# Patient Record
Sex: MALE | Race: WHITE | NOT HISPANIC OR LATINO | Employment: OTHER | URBAN - METROPOLITAN AREA
[De-identification: names, ages, dates, MRNs, and addresses within clinical notes are randomized per-mention and may not be internally consistent; named-entity substitution may affect disease eponyms.]

---

## 2023-08-23 PROBLEM — R31.9 HEMATURIA: Status: ACTIVE | Noted: 2023-08-23

## 2023-08-23 PROBLEM — T81.42XD: Status: ACTIVE | Noted: 2023-08-23

## 2023-08-23 PROBLEM — N52.9 ERECTILE DYSFUNCTION: Status: ACTIVE | Noted: 2023-08-23

## 2023-08-23 PROBLEM — M25.551 CHRONIC PAIN OF RIGHT HIP: Status: ACTIVE | Noted: 2023-08-23

## 2023-08-23 PROBLEM — M87.051 AVASCULAR NECROSIS OF HIP, RIGHT (MULTI): Status: ACTIVE | Noted: 2023-08-23

## 2023-08-23 PROBLEM — S46.011A STRAIN OF MUSC/TEND THE ROTATOR CUFF OF RIGHT SHOULDER, INIT: Status: ACTIVE | Noted: 2023-08-23

## 2023-08-23 PROBLEM — H65.492: Status: ACTIVE | Noted: 2023-08-23

## 2023-08-23 PROBLEM — D49.0 PAROTID NEOPLASM: Status: ACTIVE | Noted: 2023-08-23

## 2023-08-23 PROBLEM — H60.90 OTITIS EXTERNA: Status: ACTIVE | Noted: 2023-08-23

## 2023-08-23 PROBLEM — E78.5 HYPERLIPIDEMIA: Status: ACTIVE | Noted: 2023-08-23

## 2023-08-23 PROBLEM — T84.039A LOOSE ORTHOPEDIC IMPLANT (CMS-HCC): Status: ACTIVE | Noted: 2023-08-23

## 2023-08-23 PROBLEM — H93.13 SUBJECTIVE TINNITUS OF BOTH EARS: Status: ACTIVE | Noted: 2023-08-23

## 2023-08-23 PROBLEM — D36.9 ADENOMATOUS POLYPS: Status: ACTIVE | Noted: 2023-08-23

## 2023-08-23 PROBLEM — G89.29 CHRONIC PAIN OF BOTH SHOULDERS: Status: ACTIVE | Noted: 2023-08-23

## 2023-08-23 PROBLEM — I72.8 SPLENIC ARTERY ANEURYSM (CMS-HCC): Status: ACTIVE | Noted: 2023-08-23

## 2023-08-23 PROBLEM — R79.89 LOW TESTOSTERONE: Status: ACTIVE | Noted: 2023-08-23

## 2023-08-23 PROBLEM — R82.90 ABNORMAL FINDING IN URINE: Status: ACTIVE | Noted: 2023-08-23

## 2023-08-23 PROBLEM — M25.512 LEFT SHOULDER PAIN: Status: ACTIVE | Noted: 2023-08-23

## 2023-08-23 PROBLEM — E55.9 VITAMIN D DEFICIENCY: Status: ACTIVE | Noted: 2023-08-23

## 2023-08-23 PROBLEM — J01.90 ACUTE SINUSITIS: Status: ACTIVE | Noted: 2023-08-23

## 2023-08-23 PROBLEM — M25.569 KNEE PAIN: Status: ACTIVE | Noted: 2023-08-23

## 2023-08-23 PROBLEM — F17.200 TOBACCO USE DISORDER: Status: ACTIVE | Noted: 2023-08-23

## 2023-08-23 PROBLEM — N28.1 RENAL CYST, LEFT: Status: ACTIVE | Noted: 2023-08-23

## 2023-08-23 PROBLEM — M17.12 PRIMARY OSTEOARTHRITIS OF LEFT KNEE: Status: ACTIVE | Noted: 2023-08-23

## 2023-08-23 PROBLEM — I10 HYPERTENSION: Status: ACTIVE | Noted: 2023-08-23

## 2023-08-23 PROBLEM — M54.6 THORACIC SPINE PAIN: Status: ACTIVE | Noted: 2023-08-23

## 2023-08-23 PROBLEM — M25.551 RIGHT HIP PAIN: Status: ACTIVE | Noted: 2023-08-23

## 2023-08-23 PROBLEM — J18.9 CAP (COMMUNITY ACQUIRED PNEUMONIA): Status: ACTIVE | Noted: 2023-08-23

## 2023-08-23 PROBLEM — J30.9 ALLERGIC RHINITIS: Status: ACTIVE | Noted: 2023-08-23

## 2023-08-23 PROBLEM — R36.1 BLOOD IN SEMEN: Status: ACTIVE | Noted: 2023-08-23

## 2023-08-23 PROBLEM — D11.0 BENIGN NEOPLASM OF PAROTID GLAND: Status: ACTIVE | Noted: 2023-08-23

## 2023-08-23 PROBLEM — R20.2 PARESTHESIA: Status: ACTIVE | Noted: 2023-08-23

## 2023-08-23 PROBLEM — A04.72 C. DIFFICILE COLITIS: Status: ACTIVE | Noted: 2023-08-23

## 2023-08-23 PROBLEM — M54.9 CHRONIC BACK PAIN: Status: ACTIVE | Noted: 2023-08-23

## 2023-08-23 PROBLEM — G89.29 CHRONIC PAIN OF RIGHT HIP: Status: ACTIVE | Noted: 2023-08-23

## 2023-08-23 PROBLEM — G56.03 BILATERAL CARPAL TUNNEL SYNDROME: Status: ACTIVE | Noted: 2023-08-23

## 2023-08-23 PROBLEM — G89.29 CHRONIC BACK PAIN: Status: ACTIVE | Noted: 2023-08-23

## 2023-08-23 PROBLEM — M79.659 THIGH PAIN: Status: ACTIVE | Noted: 2023-08-23

## 2023-08-23 PROBLEM — N40.1 BPH WITH OBSTRUCTION/LOWER URINARY TRACT SYMPTOMS: Status: ACTIVE | Noted: 2023-08-23

## 2023-08-23 PROBLEM — T84.51XA INFECTION OF RIGHT PROSTHETIC HIP JOINT (CMS-HCC): Status: ACTIVE | Noted: 2023-08-23

## 2023-08-23 PROBLEM — K21.9 ESOPHAGEAL REFLUX: Status: ACTIVE | Noted: 2023-08-23

## 2023-08-23 PROBLEM — N52.9 ORGANIC IMPOTENCE: Status: ACTIVE | Noted: 2023-08-23

## 2023-08-23 PROBLEM — R06.09 DYSPNEA ON EXERTION: Status: ACTIVE | Noted: 2023-08-23

## 2023-08-23 PROBLEM — R73.09 ABNORMAL GLUCOSE: Status: ACTIVE | Noted: 2023-08-23

## 2023-08-23 PROBLEM — E66.9 OBESITY: Status: ACTIVE | Noted: 2023-08-23

## 2023-08-23 PROBLEM — M17.11 PRIMARY LOCALIZED OSTEOARTHROSIS OF RIGHT LOWER LEG: Status: ACTIVE | Noted: 2023-08-23

## 2023-08-23 PROBLEM — N13.8 BPH WITH OBSTRUCTION/LOWER URINARY TRACT SYMPTOMS: Status: ACTIVE | Noted: 2023-08-23

## 2023-08-23 PROBLEM — M25.512 CHRONIC PAIN OF BOTH SHOULDERS: Status: ACTIVE | Noted: 2023-08-23

## 2023-08-23 PROBLEM — H69.90 EUSTACHIAN TUBE DYSFUNCTION: Status: ACTIVE | Noted: 2023-08-23

## 2023-08-23 PROBLEM — D64.9 ANEMIA: Status: ACTIVE | Noted: 2023-08-23

## 2023-08-23 PROBLEM — N20.0 NEPHROLITHIASIS: Status: ACTIVE | Noted: 2023-08-23

## 2023-08-23 PROBLEM — T84.9XXA: Status: ACTIVE | Noted: 2023-08-23

## 2023-08-23 PROBLEM — E66.01 MORBIDLY OBESE (MULTI): Status: ACTIVE | Noted: 2023-08-23

## 2023-08-23 PROBLEM — E11.9 TYPE 2 DIABETES MELLITUS (MULTI): Status: ACTIVE | Noted: 2023-08-23

## 2023-08-23 PROBLEM — N28.89 RENAL MASS: Status: ACTIVE | Noted: 2023-08-23

## 2023-08-23 PROBLEM — J44.9 COPD (CHRONIC OBSTRUCTIVE PULMONARY DISEASE) (MULTI): Status: ACTIVE | Noted: 2023-08-23

## 2023-08-23 PROBLEM — T84.59XA PROSTHETIC HIP INFECTION (CMS-HCC): Status: ACTIVE | Noted: 2023-08-23

## 2023-08-23 PROBLEM — M25.511 CHRONIC PAIN OF BOTH SHOULDERS: Status: ACTIVE | Noted: 2023-08-23

## 2023-08-23 PROBLEM — L53.9 ERYTHEMA: Status: ACTIVE | Noted: 2023-08-23

## 2023-08-23 PROBLEM — F32.A DEPRESSION: Status: ACTIVE | Noted: 2023-08-23

## 2023-08-23 PROBLEM — H90.3 SENSORINEURAL HEARING LOSS, BILATERAL: Status: ACTIVE | Noted: 2023-08-23

## 2023-08-23 PROBLEM — M62.838 TRAPEZIUS MUSCLE SPASM: Status: ACTIVE | Noted: 2023-08-23

## 2023-08-23 PROBLEM — M17.9 OSTEOARTHRITIS OF KNEE: Status: ACTIVE | Noted: 2023-08-23

## 2023-08-23 PROBLEM — D36.9 TUBULAR ADENOMA: Status: ACTIVE | Noted: 2023-08-23

## 2023-08-23 PROBLEM — L29.9 ITCHING: Status: ACTIVE | Noted: 2023-08-23

## 2023-08-23 PROBLEM — J20.9 ACUTE BRONCHITIS: Status: ACTIVE | Noted: 2023-08-23

## 2023-08-23 PROBLEM — K11.7 SALIVARY SECRETION DISTURBANCE: Status: ACTIVE | Noted: 2023-08-23

## 2023-08-23 PROBLEM — A49.8 ENTEROCOCCUS FAECALIS INFECTION: Status: ACTIVE | Noted: 2023-08-23

## 2023-08-23 PROBLEM — E74.39 OTHER DISORDERS OF INTESTINAL CARBOHYDRATE ABSORPTION: Status: ACTIVE | Noted: 2023-08-23

## 2023-08-23 PROBLEM — M54.2 NECK PAIN: Status: ACTIVE | Noted: 2023-08-23

## 2023-08-23 PROBLEM — Z79.2 LONG TERM (CURRENT) USE OF ANTIBIOTICS: Status: ACTIVE | Noted: 2023-08-23

## 2023-08-23 PROBLEM — H65.00 ACUTE SEROUS OTITIS MEDIA: Status: ACTIVE | Noted: 2023-08-23

## 2023-08-23 PROBLEM — R97.20 ELEVATED PROSTATE SPECIFIC ANTIGEN (PSA): Status: ACTIVE | Noted: 2023-08-23

## 2023-08-23 PROBLEM — M25.559 PAINFUL HIP: Status: ACTIVE | Noted: 2023-08-23

## 2023-08-23 PROBLEM — Z96.649 PROSTHETIC HIP INFECTION (CMS-HCC): Status: ACTIVE | Noted: 2023-08-23

## 2023-08-23 PROBLEM — G47.30 SLEEP APNEA: Status: ACTIVE | Noted: 2023-08-23

## 2023-08-23 PROBLEM — R19.7 DIARRHEA: Status: ACTIVE | Noted: 2023-08-23

## 2023-08-23 PROBLEM — E29.1 HYPOGONADISM MALE: Status: ACTIVE | Noted: 2023-08-23

## 2023-08-23 PROBLEM — G89.29 CHRONIC PAIN: Status: ACTIVE | Noted: 2023-08-23

## 2023-08-23 PROBLEM — M54.50 LOW BACK PAIN: Status: ACTIVE | Noted: 2023-08-23

## 2023-08-23 PROBLEM — A49.02 MRSA INFECTION: Status: ACTIVE | Noted: 2023-08-23

## 2023-08-23 PROBLEM — R73.9 HYPERGLYCEMIA: Status: ACTIVE | Noted: 2023-08-23

## 2023-08-23 PROBLEM — M25.50 ARTHRALGIA: Status: ACTIVE | Noted: 2023-08-23

## 2023-08-23 PROBLEM — Z85.528 HISTORY OF KIDNEY CANCER: Status: ACTIVE | Noted: 2023-08-23

## 2023-08-23 RX ORDER — TAMSULOSIN HYDROCHLORIDE 0.4 MG/1
1 CAPSULE ORAL DAILY
COMMUNITY
Start: 2021-04-26 | End: 2024-01-26 | Stop reason: SDUPTHER

## 2023-08-23 RX ORDER — METRONIDAZOLE 7.5 MG/G
GEL TOPICAL 2 TIMES DAILY
COMMUNITY
Start: 2022-09-08

## 2023-08-23 RX ORDER — ALBUTEROL SULFATE 90 UG/1
2 AEROSOL, METERED RESPIRATORY (INHALATION)
COMMUNITY
Start: 2013-01-15

## 2023-08-23 RX ORDER — SILDENAFIL 100 MG/1
1 TABLET, FILM COATED ORAL
COMMUNITY
Start: 2021-06-03

## 2023-08-23 RX ORDER — COVID-19 MOLECULAR TEST ASSAY
KIT MISCELLANEOUS
COMMUNITY
Start: 2023-01-23

## 2023-08-23 RX ORDER — CETIRIZINE HYDROCHLORIDE 5 MG/1
1 TABLET ORAL EVERY EVENING
COMMUNITY
Start: 2017-05-05

## 2023-08-23 RX ORDER — OXYBUTYNIN CHLORIDE 10 MG/1
1 TABLET, EXTENDED RELEASE ORAL DAILY
COMMUNITY
Start: 2022-04-18

## 2023-08-23 RX ORDER — TROSPIUM CHLORIDE ER 60 MG/1
1 CAPSULE ORAL DAILY
COMMUNITY
Start: 2023-02-13

## 2023-08-23 RX ORDER — BUDESONIDE AND FORMOTEROL FUMARATE DIHYDRATE 160; 4.5 UG/1; UG/1
2 AEROSOL RESPIRATORY (INHALATION) 2 TIMES DAILY
COMMUNITY
Start: 2017-09-23 | End: 2023-09-21 | Stop reason: SDUPTHER

## 2023-08-23 RX ORDER — TESTOSTERONE 20.25 MG/1.25G
1 GEL TOPICAL DAILY
COMMUNITY
Start: 2021-06-03

## 2023-08-23 RX ORDER — PRAVASTATIN SODIUM 20 MG/1
1 TABLET ORAL DAILY
COMMUNITY
Start: 2014-12-12 | End: 2023-09-01

## 2023-08-23 RX ORDER — DOXYCYCLINE HYCLATE 20 MG
1 TABLET ORAL
COMMUNITY
Start: 2022-09-07

## 2023-08-23 RX ORDER — OMEPRAZOLE 20 MG/1
1 CAPSULE, DELAYED RELEASE ORAL DAILY
COMMUNITY
Start: 2019-02-14 | End: 2023-12-04

## 2023-08-23 RX ORDER — PREDNISONE 20 MG/1
1 TABLET ORAL 2 TIMES DAILY
COMMUNITY
Start: 2022-11-21

## 2023-08-23 RX ORDER — POLYETHYLENE GLYCOL 3350 17 G/17G
POWDER, FOR SOLUTION ORAL
COMMUNITY

## 2023-08-23 RX ORDER — AMOXICILLIN 875 MG/1
1 TABLET, FILM COATED ORAL
COMMUNITY
Start: 2020-08-21 | End: 2023-12-08 | Stop reason: SDUPTHER

## 2023-08-23 RX ORDER — ALBUTEROL SULFATE 0.83 MG/ML
SOLUTION RESPIRATORY (INHALATION)
COMMUNITY
Start: 2017-09-15

## 2023-08-23 RX ORDER — POLYETHYLENE GLYCOL 3350, SODIUM CHLORIDE, SODIUM BICARBONATE, POTASSIUM CHLORIDE 420; 11.2; 5.72; 1.48 G/4L; G/4L; G/4L; G/4L
POWDER, FOR SOLUTION ORAL
COMMUNITY
Start: 2021-11-02

## 2023-08-25 PROBLEM — D18.01 HEMANGIOMA OF SKIN AND SUBCUTANEOUS TISSUE: Status: ACTIVE | Noted: 2022-09-07

## 2023-08-25 PROBLEM — D22.70 MELANOCYTIC NEVI OF UNSPECIFIED LOWER LIMB, INCLUDING HIP: Status: ACTIVE | Noted: 2022-09-07

## 2023-08-25 PROBLEM — L90.5 SCAR CONDITION AND FIBROSIS OF SKIN: Status: ACTIVE | Noted: 2022-09-07

## 2023-08-25 PROBLEM — L57.9 SKIN CHANGES DUE TO CHRONIC EXPOSURE TO NONIONIZING RADIATION, UNSPECIFIED: Status: ACTIVE | Noted: 2022-09-07

## 2023-08-25 PROBLEM — D22.39 MELANOCYTIC NEVI OF OTHER PARTS OF FACE: Status: ACTIVE | Noted: 2022-09-07

## 2023-08-25 PROBLEM — D22.60 MELANOCYTIC NEVI OF UNSPECIFIED UPPER LIMB, INCLUDING SHOULDER: Status: ACTIVE | Noted: 2022-09-07

## 2023-08-25 PROBLEM — L71.9 ROSACEA, UNSPECIFIED: Status: ACTIVE | Noted: 2022-09-07

## 2023-08-25 PROBLEM — L71.1 RHINOPHYMA: Status: ACTIVE | Noted: 2022-09-07

## 2023-08-25 PROBLEM — D48.5 NEOPLASM OF UNCERTAIN BEHAVIOR OF SKIN: Status: ACTIVE | Noted: 2022-09-07

## 2023-08-25 PROBLEM — L30.4 ERYTHEMA INTERTRIGO: Status: ACTIVE | Noted: 2022-09-07

## 2023-08-25 PROBLEM — D22.5 MELANOCYTIC NEVI OF TRUNK: Status: ACTIVE | Noted: 2022-09-07

## 2023-08-25 PROBLEM — L82.1 OTHER SEBORRHEIC KERATOSIS: Status: ACTIVE | Noted: 2022-09-07

## 2023-08-25 PROBLEM — L81.4 OTHER MELANIN HYPERPIGMENTATION: Status: ACTIVE | Noted: 2022-09-07

## 2023-08-25 PROBLEM — L57.0 ACTINIC KERATOSIS: Status: ACTIVE | Noted: 2022-09-07

## 2023-09-01 ENCOUNTER — OFFICE VISIT (OUTPATIENT)
Dept: PRIMARY CARE | Facility: CLINIC | Age: 68
End: 2023-09-01
Payer: MEDICARE

## 2023-09-01 VITALS
HEIGHT: 72 IN | DIASTOLIC BLOOD PRESSURE: 71 MMHG | SYSTOLIC BLOOD PRESSURE: 135 MMHG | HEART RATE: 78 BPM | BODY MASS INDEX: 34.54 KG/M2 | WEIGHT: 255 LBS

## 2023-09-01 DIAGNOSIS — L20.82 FLEXURAL ECZEMA: ICD-10-CM

## 2023-09-01 DIAGNOSIS — M54.42 CHRONIC BILATERAL LOW BACK PAIN WITH BILATERAL SCIATICA: ICD-10-CM

## 2023-09-01 DIAGNOSIS — E78.2 MIXED HYPERLIPIDEMIA: ICD-10-CM

## 2023-09-01 DIAGNOSIS — I10 ESSENTIAL HYPERTENSION, BENIGN: ICD-10-CM

## 2023-09-01 DIAGNOSIS — M54.41 CHRONIC BILATERAL LOW BACK PAIN WITH BILATERAL SCIATICA: ICD-10-CM

## 2023-09-01 DIAGNOSIS — F32.A DEPRESSION, UNSPECIFIED DEPRESSION TYPE: ICD-10-CM

## 2023-09-01 DIAGNOSIS — G89.29 CHRONIC PAIN OF BOTH SHOULDERS: ICD-10-CM

## 2023-09-01 DIAGNOSIS — M25.512 CHRONIC PAIN OF BOTH SHOULDERS: ICD-10-CM

## 2023-09-01 DIAGNOSIS — R36.1 BLOOD IN SEMEN: ICD-10-CM

## 2023-09-01 DIAGNOSIS — R06.09 DYSPNEA ON EXERTION: Primary | ICD-10-CM

## 2023-09-01 DIAGNOSIS — K21.9 GASTROESOPHAGEAL REFLUX DISEASE WITHOUT ESOPHAGITIS: ICD-10-CM

## 2023-09-01 DIAGNOSIS — F17.210 CIGARETTE SMOKER: ICD-10-CM

## 2023-09-01 DIAGNOSIS — N40.1 BENIGN PROSTATIC HYPERPLASIA WITH URINARY FREQUENCY: ICD-10-CM

## 2023-09-01 DIAGNOSIS — J44.9 CHRONIC OBSTRUCTIVE PULMONARY DISEASE, UNSPECIFIED COPD TYPE (MULTI): ICD-10-CM

## 2023-09-01 DIAGNOSIS — E11.69 TYPE 2 DIABETES MELLITUS WITH OTHER SPECIFIED COMPLICATION, UNSPECIFIED WHETHER LONG TERM INSULIN USE (MULTI): ICD-10-CM

## 2023-09-01 DIAGNOSIS — R35.0 BENIGN PROSTATIC HYPERPLASIA WITH URINARY FREQUENCY: ICD-10-CM

## 2023-09-01 DIAGNOSIS — D64.9 ANEMIA, UNSPECIFIED TYPE: ICD-10-CM

## 2023-09-01 DIAGNOSIS — G89.29 CHRONIC BILATERAL LOW BACK PAIN WITH BILATERAL SCIATICA: ICD-10-CM

## 2023-09-01 DIAGNOSIS — F17.200 TOBACCO USE DISORDER: ICD-10-CM

## 2023-09-01 DIAGNOSIS — M25.511 CHRONIC PAIN OF BOTH SHOULDERS: ICD-10-CM

## 2023-09-01 PROBLEM — M87.051 AVASCULAR NECROSIS OF HIP, RIGHT (MULTI): Status: RESOLVED | Noted: 2023-08-23 | Resolved: 2023-09-01

## 2023-09-01 PROBLEM — E66.01 MORBIDLY OBESE (MULTI): Status: RESOLVED | Noted: 2023-08-23 | Resolved: 2023-09-01

## 2023-09-01 LAB
ALANINE AMINOTRANSFERASE (SGPT) (U/L) IN SER/PLAS: 47 U/L (ref 10–52)
ALBUMIN (G/DL) IN SER/PLAS: 4.5 G/DL (ref 3.4–5)
ALKALINE PHOSPHATASE (U/L) IN SER/PLAS: 55 U/L (ref 33–136)
ANION GAP IN SER/PLAS: 15 MMOL/L (ref 10–20)
ASPARTATE AMINOTRANSFERASE (SGOT) (U/L) IN SER/PLAS: 24 U/L (ref 9–39)
BASOPHILS (10*3/UL) IN BLOOD BY AUTOMATED COUNT: 0.03 X10E9/L (ref 0–0.1)
BASOPHILS/100 LEUKOCYTES IN BLOOD BY AUTOMATED COUNT: 0.5 % (ref 0–2)
BILIRUBIN TOTAL (MG/DL) IN SER/PLAS: 0.8 MG/DL (ref 0–1.2)
CALCIUM (MG/DL) IN SER/PLAS: 10.6 MG/DL (ref 8.6–10.6)
CARBON DIOXIDE, TOTAL (MMOL/L) IN SER/PLAS: 29 MMOL/L (ref 21–32)
CHLORIDE (MMOL/L) IN SER/PLAS: 100 MMOL/L (ref 98–107)
CHOLESTEROL (MG/DL) IN SER/PLAS: 172 MG/DL (ref 0–199)
CHOLESTEROL IN HDL (MG/DL) IN SER/PLAS: 35 MG/DL
CHOLESTEROL/HDL RATIO: 4.9
CREATININE (MG/DL) IN SER/PLAS: 1 MG/DL (ref 0.5–1.3)
EOSINOPHILS (10*3/UL) IN BLOOD BY AUTOMATED COUNT: 0.11 X10E9/L (ref 0–0.7)
EOSINOPHILS/100 LEUKOCYTES IN BLOOD BY AUTOMATED COUNT: 1.8 % (ref 0–6)
ERYTHROCYTE DISTRIBUTION WIDTH (RATIO) BY AUTOMATED COUNT: 13.1 % (ref 11.5–14.5)
ERYTHROCYTE MEAN CORPUSCULAR HEMOGLOBIN CONCENTRATION (G/DL) BY AUTOMATED: 32.2 G/DL (ref 32–36)
ERYTHROCYTE MEAN CORPUSCULAR VOLUME (FL) BY AUTOMATED COUNT: 99 FL (ref 80–100)
ERYTHROCYTES (10*6/UL) IN BLOOD BY AUTOMATED COUNT: 4.93 X10E12/L (ref 4.5–5.9)
GFR MALE: 82 ML/MIN/1.73M2
GLUCOSE (MG/DL) IN SER/PLAS: 211 MG/DL (ref 74–99)
HEMATOCRIT (%) IN BLOOD BY AUTOMATED COUNT: 49 % (ref 41–52)
HEMOGLOBIN (G/DL) IN BLOOD: 15.8 G/DL (ref 13.5–17.5)
IMMATURE GRANULOCYTES/100 LEUKOCYTES IN BLOOD BY AUTOMATED COUNT: 0.2 % (ref 0–0.9)
LDL: ABNORMAL MG/DL (ref 0–99)
LEUKOCYTES (10*3/UL) IN BLOOD BY AUTOMATED COUNT: 6 X10E9/L (ref 4.4–11.3)
LYMPHOCYTES (10*3/UL) IN BLOOD BY AUTOMATED COUNT: 1.48 X10E9/L (ref 1.2–4.8)
LYMPHOCYTES/100 LEUKOCYTES IN BLOOD BY AUTOMATED COUNT: 24.7 % (ref 13–44)
MONOCYTES (10*3/UL) IN BLOOD BY AUTOMATED COUNT: 0.48 X10E9/L (ref 0.1–1)
MONOCYTES/100 LEUKOCYTES IN BLOOD BY AUTOMATED COUNT: 8 % (ref 2–10)
NEUTROPHILS (10*3/UL) IN BLOOD BY AUTOMATED COUNT: 3.89 X10E9/L (ref 1.2–7.7)
NEUTROPHILS/100 LEUKOCYTES IN BLOOD BY AUTOMATED COUNT: 64.8 % (ref 40–80)
NON HDL CHOLESTEROL: 137 MG/DL
NRBC (PER 100 WBCS) BY AUTOMATED COUNT: 0 /100 WBC (ref 0–0)
PLATELETS (10*3/UL) IN BLOOD AUTOMATED COUNT: 174 X10E9/L (ref 150–450)
POC HEMOGLOBIN A1C: 8.2 % (ref 4.2–6.5)
POTASSIUM (MMOL/L) IN SER/PLAS: 5 MMOL/L (ref 3.5–5.3)
PROSTATE SPECIFIC AG (NG/ML) IN SER/PLAS: 1.43 NG/ML (ref 0–4)
PROTEIN TOTAL: 7.3 G/DL (ref 6.4–8.2)
SODIUM (MMOL/L) IN SER/PLAS: 139 MMOL/L (ref 136–145)
TRIGLYCERIDE (MG/DL) IN SER/PLAS: 427 MG/DL (ref 0–149)
UREA NITROGEN (MG/DL) IN SER/PLAS: 13 MG/DL (ref 6–23)
VLDL: ABNORMAL MG/DL (ref 0–40)

## 2023-09-01 PROCEDURE — 1159F MED LIST DOCD IN RCRD: CPT | Performed by: FAMILY MEDICINE

## 2023-09-01 PROCEDURE — 4004F PT TOBACCO SCREEN RCVD TLK: CPT | Performed by: FAMILY MEDICINE

## 2023-09-01 PROCEDURE — 84153 ASSAY OF PSA TOTAL: CPT

## 2023-09-01 PROCEDURE — 80053 COMPREHEN METABOLIC PANEL: CPT

## 2023-09-01 PROCEDURE — 80061 LIPID PANEL: CPT

## 2023-09-01 PROCEDURE — 85025 COMPLETE CBC W/AUTO DIFF WBC: CPT

## 2023-09-01 PROCEDURE — 4010F ACE/ARB THERAPY RXD/TAKEN: CPT | Performed by: FAMILY MEDICINE

## 2023-09-01 PROCEDURE — 83036 HEMOGLOBIN GLYCOSYLATED A1C: CPT | Performed by: FAMILY MEDICINE

## 2023-09-01 PROCEDURE — 3075F SYST BP GE 130 - 139MM HG: CPT | Performed by: FAMILY MEDICINE

## 2023-09-01 PROCEDURE — 3078F DIAST BP <80 MM HG: CPT | Performed by: FAMILY MEDICINE

## 2023-09-01 PROCEDURE — 1125F AMNT PAIN NOTED PAIN PRSNT: CPT | Performed by: FAMILY MEDICINE

## 2023-09-01 PROCEDURE — 1160F RVW MEDS BY RX/DR IN RCRD: CPT | Performed by: FAMILY MEDICINE

## 2023-09-01 PROCEDURE — 99214 OFFICE O/P EST MOD 30 MIN: CPT | Performed by: FAMILY MEDICINE

## 2023-09-01 RX ORDER — TRIAMCINOLONE ACETONIDE 1 MG/G
CREAM TOPICAL 2 TIMES DAILY
Qty: 45 G | Refills: 1 | Status: SHIPPED | OUTPATIENT
Start: 2023-09-01 | End: 2023-09-15

## 2023-09-01 ASSESSMENT — ENCOUNTER SYMPTOMS
WHEEZING: 1
NECK STIFFNESS: 1
GASTROINTESTINAL NEGATIVE: 1
CONSTITUTIONAL NEGATIVE: 1
NECK PAIN: 1
ARTHRALGIAS: 1
MYALGIAS: 1
CARDIOVASCULAR NEGATIVE: 1
BACK PAIN: 1
JOINT SWELLING: 1

## 2023-09-01 NOTE — PROGRESS NOTES
Subjective   Patient ID: Sarthak Portillo is a 68 y.o. male who presents for Med Refill (Pt already received his meds. He is just here for the follow up appointment. ).    Med Refill  Associated symptoms include arthralgias, joint swelling, myalgias and neck pain.    copd, htn, chf, dm , chol, bph    Review of Systems   Constitutional: Negative.    HENT: Negative.     Respiratory:  Positive for wheezing.    Cardiovascular: Negative.    Gastrointestinal: Negative.    Musculoskeletal:  Positive for arthralgias, back pain, gait problem, joint swelling, myalgias, neck pain and neck stiffness.       Objective   /71   Pulse 78   Ht 1.829 m (6')   Wt 116 kg (255 lb)   BMI 34.58 kg/m²     Physical Exam  Vitals reviewed.   Constitutional:       Appearance: Normal appearance. He is obese.   Eyes:      Extraocular Movements: Extraocular movements intact.      Conjunctiva/sclera: Conjunctivae normal.      Pupils: Pupils are equal, round, and reactive to light.   Cardiovascular:      Rate and Rhythm: Normal rate and regular rhythm.      Pulses: Normal pulses.      Heart sounds: Normal heart sounds.   Pulmonary:      Effort: Pulmonary effort is normal.      Breath sounds: Normal breath sounds.   Abdominal:      General: Bowel sounds are normal.      Palpations: Abdomen is soft.   Musculoskeletal:         General: Normal range of motion.      Comments: Severe joint and lumbar spine dis w significant limitation of mobility   Skin:     General: Skin is warm and dry.   Neurological:      General: No focal deficit present.      Mental Status: He is alert and oriented to person, place, and time. Mental status is at baseline.         Assessment/Plan   Problem List Items Addressed This Visit       Anemia    Relevant Orders    CBC and Auto Differential    Blood in semen    Chronic back pain    Chronic pain of both shoulders    COPD (chronic obstructive pulmonary disease) (CMS/HCC)    Depression    Dyspnea on exertion - Primary     Esophageal reflux    Relevant Orders    CBC and Auto Differential    Hyperlipidemia    Relevant Orders    Comprehensive Metabolic Panel    Lipid Panel    Tobacco use disorder    Relevant Orders    CT lung screening low dose    Type 2 diabetes mellitus (CMS/HCC)    Relevant Orders    POCT glycosylated hemoglobin (Hb A1C) manually resulted     Other Visit Diagnoses       Benign prostatic hyperplasia with urinary frequency        Relevant Orders    Prostate Specific Antigen    Flexural eczema        Relevant Medications    triamcinolone (Kenalog) 0.1 % cream    Essential hypertension, benign        Relevant Orders    Comprehensive Metabolic Panel    Cigarette smoker        Relevant Orders    CT lung screening low dose        Active smoker over 25 year smoking hx due for lung ct screen  Dm uncont start jardiance 10 mg

## 2023-09-05 ENCOUNTER — TELEPHONE (OUTPATIENT)
Dept: PRIMARY CARE | Facility: CLINIC | Age: 68
End: 2023-09-05
Payer: COMMERCIAL

## 2023-09-06 DIAGNOSIS — E78.5 DYSLIPIDEMIA: ICD-10-CM

## 2023-09-06 RX ORDER — ROSUVASTATIN CALCIUM 20 MG/1
20 TABLET, COATED ORAL DAILY
Qty: 90 TABLET | Refills: 0 | Status: SHIPPED | OUTPATIENT
Start: 2023-09-06 | End: 2023-12-01

## 2023-09-19 ENCOUNTER — TELEPHONE (OUTPATIENT)
Dept: PRIMARY CARE | Facility: CLINIC | Age: 68
End: 2023-09-19
Payer: COMMERCIAL

## 2023-09-20 ENCOUNTER — TELEPHONE (OUTPATIENT)
Dept: PRIMARY CARE | Facility: CLINIC | Age: 68
End: 2023-09-20
Payer: COMMERCIAL

## 2023-09-21 DIAGNOSIS — J44.9 CHRONIC OBSTRUCTIVE PULMONARY DISEASE, UNSPECIFIED COPD TYPE (MULTI): Primary | ICD-10-CM

## 2023-09-21 RX ORDER — BUDESONIDE AND FORMOTEROL FUMARATE DIHYDRATE 160; 4.5 UG/1; UG/1
2 AEROSOL RESPIRATORY (INHALATION) 2 TIMES DAILY
Qty: 1 EACH | Refills: 0 | Status: SHIPPED | OUTPATIENT
Start: 2023-09-21 | End: 2023-10-19

## 2023-10-03 ENCOUNTER — OFFICE VISIT (OUTPATIENT)
Dept: ORTHOPEDIC SURGERY | Facility: HOSPITAL | Age: 68
End: 2023-10-03
Payer: MEDICARE

## 2023-10-03 DIAGNOSIS — M19.019 SHOULDER ARTHRITIS: Primary | ICD-10-CM

## 2023-10-03 PROCEDURE — 20610 DRAIN/INJ JOINT/BURSA W/O US: CPT | Mod: RT | Performed by: ORTHOPAEDIC SURGERY

## 2023-10-03 PROCEDURE — 1125F AMNT PAIN NOTED PAIN PRSNT: CPT | Performed by: ORTHOPAEDIC SURGERY

## 2023-10-03 PROCEDURE — 1160F RVW MEDS BY RX/DR IN RCRD: CPT | Performed by: ORTHOPAEDIC SURGERY

## 2023-10-03 PROCEDURE — 99213 OFFICE O/P EST LOW 20 MIN: CPT | Performed by: ORTHOPAEDIC SURGERY

## 2023-10-03 PROCEDURE — 4010F ACE/ARB THERAPY RXD/TAKEN: CPT | Performed by: ORTHOPAEDIC SURGERY

## 2023-10-03 PROCEDURE — 1159F MED LIST DOCD IN RCRD: CPT | Performed by: ORTHOPAEDIC SURGERY

## 2023-10-03 PROCEDURE — 4004F PT TOBACCO SCREEN RCVD TLK: CPT | Performed by: ORTHOPAEDIC SURGERY

## 2023-10-03 NOTE — LETTER
October 4, 2023     Ayo Zuniga MD  [address] [place] [address] Rd  [address]  [facility] [address]    Patient: Sarthak Portillo   YOB: 1955   Date of Visit: 10/3/2023       Dear Dr. Ayo Zuniga MD:    Thank you for referring Sarthak Portillo to me for evaluation. Below are my notes for this consultation.  If you have questions, please do not hesitate to call me. I look forward to following your patient along with you.       Sincerely,     Jagdish Brito MD      CC: No Recipients  ______________________________________________________________________________________    Patient ID: Sarthak Portillo is a 68 y.o. male.    L Inj/Asp: R subacromial bursa on 10/3/2023 8:46 AM  Indications: pain  Details: 20 G needle  Outcome: tolerated well, no immediate complications  Procedure, treatment alternatives, risks and benefits explained, specific risks discussed. Consent was given by the patient. Immediately prior to procedure a time out was called to verify the correct patient, procedure, equipment, support staff and site/side marked as required. Patient was prepped and draped in the usual sterile fashion.           Subjective   Patient ID: Sarthak Portillo is a 68 y.o. male.    Chief Complaint: Right shoulder pain     Last Surgery: No surgery found  Last Surgery Date: No surgery found    He explains today that his shoulder is doing alright. His injection worked for about a month, and did provide him with relief with other pains. He is considering surgery this winter as he would like to not live with this anymore.     He has had a recent knee replacement at [facility].        Objective  Ortho Exam    Image Results:  Electrocardiogram 12 Lead  Normal sinus rhythm  Possible Left atrial enlargement  Nonspecific ST and T wave abnormality  Abnormal ECG  No previous ECGs available      Assessment/Plan  Encounter Diagnoses:  Shoulder arthritis    Orders Placed This Encounter   • L Inj/Asp   • CT shoulder right wo IV contrast     At  this time, we had a long discussion about the various options for shoulder arthritis.       1. Do nothing and continue activity modifications.       2. Consider cortisone injections into the glenohumeral joint. This would give pain relief that is temporary. This would not stop the progression of arthritis. For some patients, this injection can provide no relief at all, relief for 2 weeks, 4 weeks, 3 months or longer. The risk of the injection is fairly minimal but does include a very small chance of infection.       3. Another option is a total shoulder replacement. This will give the patient a more permanent solution to pain relief. It would also improve function. There is no rush to this procedure; it is an elective procedure.      Sarthak have severe arthritis in their shoulder. They have failed conservative management for over 6 months with injections, therapy and home exercises. They cannot continue living with their shoulders secondary to pain and disability. They have severe erosive, destructive changes within their shoulder joint limitation of motion that do not allow them to get above their shoulder level or reach behind their back. Because of the erosive and destructive changes in their shoulder joint as seen on CT scan and plain films, in addition to their severe limitation of range of motion we have recommended a reverse shoulder replacement which will help with theirpain as well as improve their function.     He is beginning to develop arthritis. He may or may not have rotator cuff tear at this time,   because of that I recommend we do a reverse shoulder replacement.   order CT for pre operative planning to be sure we have not missed any underlying pathology. He will followup with [facility] in 2 months for right reverse shoulder replacement planning.    PLAN for RIGHT reverse total shoulder replacement.    Scribe Attestation  By signing my name below, I, Michael Caldera   attest that this documentation  has been prepared under the direction and in the presence of Jagdish Brito MD.

## 2023-10-03 NOTE — PROGRESS NOTES
Patient ID: Sarthak Portillo is a 68 y.o. male.    L Inj/Asp: R subacromial bursa on 10/3/2023 8:46 AM  Indications: pain  Details: 20 G needle  Outcome: tolerated well, no immediate complications  Procedure, treatment alternatives, risks and benefits explained, specific risks discussed. Consent was given by the patient. Immediately prior to procedure a time out was called to verify the correct patient, procedure, equipment, support staff and site/side marked as required. Patient was prepped and draped in the usual sterile fashion.           Subjective    Patient ID: Sarthak Portillo is a 68 y.o. male.    Chief Complaint: Right shoulder pain     Last Surgery: No surgery found  Last Surgery Date: No surgery found    He explains today that his shoulder is doing alright. His injection worked for about a month, and did provide him with relief with other pains. He is considering surgery this winter as he would like to not live with this anymore.     He has had a recent knee replacement at [facility].        Objective   Ortho Exam    Image Results:  Electrocardiogram 12 Lead  Normal sinus rhythm  Possible Left atrial enlargement  Nonspecific ST and T wave abnormality  Abnormal ECG  No previous ECGs available      Assessment/Plan   Encounter Diagnoses:  Shoulder arthritis    Orders Placed This Encounter    L Inj/Asp    CT shoulder right wo IV contrast     At this time, we had a long discussion about the various options for shoulder arthritis.       1. Do nothing and continue activity modifications.       2. Consider cortisone injections into the glenohumeral joint. This would give pain relief that is temporary. This would not stop the progression of arthritis. For some patients, this injection can provide no relief at all, relief for 2 weeks, 4 weeks, 3 months or longer. The risk of the injection is fairly minimal but does include a very small chance of infection.       3. Another option is a total shoulder replacement.  This will give the patient a more permanent solution to pain relief. It would also improve function. There is no rush to this procedure; it is an elective procedure.      Sarthak have severe arthritis in their shoulder. They have failed conservative management for over 6 months with injections, therapy and home exercises. They cannot continue living with their shoulders secondary to pain and disability. They have severe erosive, destructive changes within their shoulder joint limitation of motion that do not allow them to get above their shoulder level or reach behind their back. Because of the erosive and destructive changes in their shoulder joint as seen on CT scan and plain films, in addition to their severe limitation of range of motion we have recommended a reverse shoulder replacement which will help with theirpain as well as improve their function.     He is beginning to develop arthritis. He may or may not have rotator cuff tear at this time,   because of that I recommend we do a reverse shoulder replacement.   order CT for pre operative planning to be sure we have not missed any underlying pathology. He will followup with Zahida in 2 months for right reverse shoulder replacement planning.    PLAN for RIGHT reverse total shoulder replacement.    Scribe Attestation  By signing my name below, Christine LOPEZ Scribe   attest that this documentation has been prepared under the direction and in the presence of Jagdish Brito MD.

## 2023-10-05 DIAGNOSIS — M19.011 ARTHRITIS OF RIGHT SHOULDER REGION: Primary | ICD-10-CM

## 2023-10-19 DIAGNOSIS — J44.9 CHRONIC OBSTRUCTIVE PULMONARY DISEASE, UNSPECIFIED COPD TYPE (MULTI): ICD-10-CM

## 2023-10-19 RX ORDER — BUDESONIDE AND FORMOTEROL FUMARATE DIHYDRATE 160; 4.5 UG/1; UG/1
2 AEROSOL RESPIRATORY (INHALATION) 2 TIMES DAILY
Qty: 10.2 EACH | Refills: 3 | Status: SHIPPED | OUTPATIENT
Start: 2023-10-19 | End: 2023-10-22

## 2023-10-19 RX ORDER — BUDESONIDE AND FORMOTEROL FUMARATE DIHYDRATE 160; 4.5 UG/1; UG/1
2 AEROSOL RESPIRATORY (INHALATION) 2 TIMES DAILY
Qty: 10.2 EACH | Refills: 3 | Status: SHIPPED | OUTPATIENT
Start: 2023-10-19 | End: 2023-10-19

## 2023-10-22 RX ORDER — BUDESONIDE AND FORMOTEROL FUMARATE DIHYDRATE 160; 4.5 UG/1; UG/1
2 AEROSOL RESPIRATORY (INHALATION) 2 TIMES DAILY
Qty: 10.2 EACH | Refills: 3 | Status: SHIPPED | OUTPATIENT
Start: 2023-10-22 | End: 2023-11-22 | Stop reason: SDUPTHER

## 2023-11-08 DIAGNOSIS — Z00.00 ENCOUNTER FOR GENERAL ADULT MEDICAL EXAMINATION WITHOUT ABNORMAL FINDINGS: ICD-10-CM

## 2023-11-08 DIAGNOSIS — I10 ESSENTIAL (PRIMARY) HYPERTENSION: ICD-10-CM

## 2023-11-08 RX ORDER — FUROSEMIDE 40 MG/1
TABLET ORAL
Qty: 90 TABLET | Refills: 0 | Status: SHIPPED | OUTPATIENT
Start: 2023-11-08 | End: 2023-12-04

## 2023-11-08 RX ORDER — LISINOPRIL 40 MG/1
TABLET ORAL
Qty: 90 TABLET | Refills: 0 | Status: SHIPPED | OUTPATIENT
Start: 2023-11-08 | End: 2024-02-05

## 2023-11-21 ENCOUNTER — APPOINTMENT (OUTPATIENT)
Dept: ORTHOPEDIC SURGERY | Facility: HOSPITAL | Age: 68
End: 2023-11-21
Payer: MEDICARE

## 2023-11-22 DIAGNOSIS — J44.9 CHRONIC OBSTRUCTIVE PULMONARY DISEASE, UNSPECIFIED COPD TYPE (MULTI): ICD-10-CM

## 2023-11-22 RX ORDER — BUDESONIDE AND FORMOTEROL FUMARATE DIHYDRATE 160; 4.5 UG/1; UG/1
2 AEROSOL RESPIRATORY (INHALATION) 2 TIMES DAILY
Qty: 10.2 EACH | Refills: 3 | Status: SHIPPED | OUTPATIENT
Start: 2023-11-22

## 2023-12-01 ENCOUNTER — APPOINTMENT (OUTPATIENT)
Dept: INFECTIOUS DISEASES | Facility: CLINIC | Age: 68
End: 2023-12-01
Payer: MEDICARE

## 2023-12-01 DIAGNOSIS — E78.5 DYSLIPIDEMIA: ICD-10-CM

## 2023-12-01 RX ORDER — ROSUVASTATIN CALCIUM 20 MG/1
20 TABLET, COATED ORAL DAILY
Qty: 90 TABLET | Refills: 0 | Status: SHIPPED | OUTPATIENT
Start: 2023-12-01 | End: 2024-02-29

## 2023-12-03 DIAGNOSIS — K21.9 GASTROESOPHAGEAL REFLUX DISEASE WITHOUT ESOPHAGITIS: ICD-10-CM

## 2023-12-03 DIAGNOSIS — E11.69 TYPE 2 DIABETES MELLITUS WITH OTHER SPECIFIED COMPLICATION, UNSPECIFIED WHETHER LONG TERM INSULIN USE (MULTI): ICD-10-CM

## 2023-12-03 DIAGNOSIS — N52.9 MALE ERECTILE DYSFUNCTION, UNSPECIFIED: ICD-10-CM

## 2023-12-03 DIAGNOSIS — Z00.00 ENCOUNTER FOR GENERAL ADULT MEDICAL EXAMINATION WITHOUT ABNORMAL FINDINGS: ICD-10-CM

## 2023-12-04 RX ORDER — EMPAGLIFLOZIN 10 MG/1
10 TABLET, FILM COATED ORAL DAILY
Qty: 90 TABLET | Refills: 1 | Status: SHIPPED | OUTPATIENT
Start: 2023-12-04

## 2023-12-04 RX ORDER — FUROSEMIDE 40 MG/1
TABLET ORAL
Qty: 90 TABLET | Refills: 0 | Status: SHIPPED | OUTPATIENT
Start: 2023-12-04 | End: 2024-05-02

## 2023-12-04 RX ORDER — OMEPRAZOLE 20 MG/1
20 CAPSULE, DELAYED RELEASE ORAL DAILY
Qty: 90 CAPSULE | Refills: 1 | Status: SHIPPED | OUTPATIENT
Start: 2023-12-04 | End: 2024-06-06

## 2023-12-04 RX ORDER — GABAPENTIN 300 MG/1
300 CAPSULE ORAL 2 TIMES DAILY
Qty: 180 CAPSULE | Refills: 0 | Status: SHIPPED | OUTPATIENT
Start: 2023-12-04 | End: 2024-02-28 | Stop reason: SDUPTHER

## 2023-12-08 ENCOUNTER — TELEMEDICINE (OUTPATIENT)
Dept: INFECTIOUS DISEASES | Facility: CLINIC | Age: 68
End: 2023-12-08
Payer: MEDICARE

## 2023-12-08 DIAGNOSIS — T84.51XD INFECTION ASSOCIATED WITH INTERNAL RIGHT HIP PROSTHESIS, SUBSEQUENT ENCOUNTER: Primary | ICD-10-CM

## 2023-12-08 PROCEDURE — 99214 OFFICE O/P EST MOD 30 MIN: CPT | Performed by: INTERNAL MEDICINE

## 2023-12-08 RX ORDER — AMOXICILLIN 875 MG/1
875 TABLET, FILM COATED ORAL
Qty: 90 TABLET | Refills: 3 | Status: SHIPPED | OUTPATIENT
Start: 2023-12-08

## 2023-12-08 NOTE — PROGRESS NOTES
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  We saw him for the first time years ago.  Previously followed by colleagues in the left .  He has been on amoxicillin since 2020.  We reviewed recent labs which look good.  Patient says he is doing well.  No joint swelling redness erythema.  Hip is doing okay.  Having some shoulder problems and might need surgery.    Virtual visit doing well.    As per the plan from before we will continue chronic suppressive therapy with amoxicillin.  Should he need any further orthopedic procedures I told him I be happy to communicate that as long as he is on suppressive amoxicillin therapy if there is any infection and sent a dormant form and biofilm and would not provide any risk to the patient for having additional surgical procedure.  We refilled his amoxicillin for a year we will follow-up virtual visit December 5, 2024

## 2024-01-23 DIAGNOSIS — N28.1 RENAL CYST: ICD-10-CM

## 2024-01-23 DIAGNOSIS — Z85.528 HISTORY OF KIDNEY CANCER: Primary | ICD-10-CM

## 2024-01-24 ENCOUNTER — LAB (OUTPATIENT)
Dept: LAB | Facility: LAB | Age: 69
End: 2024-01-24
Payer: MEDICARE

## 2024-01-24 DIAGNOSIS — Z85.528 HISTORY OF KIDNEY CANCER: ICD-10-CM

## 2024-01-24 DIAGNOSIS — N28.1 RENAL CYST: ICD-10-CM

## 2024-01-24 LAB
CREAT SERPL-MCNC: 1.14 MG/DL (ref 0.5–1.3)
EGFRCR SERPLBLD CKD-EPI 2021: 70 ML/MIN/1.73M*2

## 2024-01-24 PROCEDURE — 36415 COLL VENOUS BLD VENIPUNCTURE: CPT

## 2024-01-24 PROCEDURE — 82565 ASSAY OF CREATININE: CPT

## 2024-01-26 ENCOUNTER — HOSPITAL ENCOUNTER (OUTPATIENT)
Dept: RADIOLOGY | Facility: CLINIC | Age: 69
Discharge: HOME | End: 2024-01-26
Payer: MEDICARE

## 2024-01-26 ENCOUNTER — ANCILLARY PROCEDURE (OUTPATIENT)
Dept: RADIOLOGY | Facility: CLINIC | Age: 69
End: 2024-01-26
Payer: MEDICARE

## 2024-01-26 ENCOUNTER — APPOINTMENT (OUTPATIENT)
Dept: RADIOLOGY | Facility: CLINIC | Age: 69
End: 2024-01-26
Payer: MEDICARE

## 2024-01-26 ENCOUNTER — TELEPHONE (OUTPATIENT)
Dept: PRIMARY CARE | Facility: CLINIC | Age: 69
End: 2024-01-26
Payer: COMMERCIAL

## 2024-01-26 DIAGNOSIS — F17.210 CIGARETTE SMOKER: ICD-10-CM

## 2024-01-26 DIAGNOSIS — Z85.528 HISTORY OF KIDNEY CANCER: ICD-10-CM

## 2024-01-26 DIAGNOSIS — N28.1 RENAL CYST: ICD-10-CM

## 2024-01-26 DIAGNOSIS — R93.89 ABNORMAL CHEST CT: Primary | ICD-10-CM

## 2024-01-26 DIAGNOSIS — N13.8 BPH WITH OBSTRUCTION/LOWER URINARY TRACT SYMPTOMS: ICD-10-CM

## 2024-01-26 DIAGNOSIS — N40.1 BPH WITH OBSTRUCTION/LOWER URINARY TRACT SYMPTOMS: ICD-10-CM

## 2024-01-26 DIAGNOSIS — F17.200 TOBACCO USE DISORDER: ICD-10-CM

## 2024-01-26 PROCEDURE — 74170 CT ABD WO CNTRST FLWD CNTRST: CPT

## 2024-01-26 PROCEDURE — 71271 CT THORAX LUNG CANCER SCR C-: CPT

## 2024-01-26 PROCEDURE — 74170 CT ABD WO CNTRST FLWD CNTRST: CPT | Performed by: RADIOLOGY

## 2024-01-26 PROCEDURE — 2550000001 HC RX 255 CONTRASTS: Performed by: STUDENT IN AN ORGANIZED HEALTH CARE EDUCATION/TRAINING PROGRAM

## 2024-01-26 RX ORDER — TAMSULOSIN HYDROCHLORIDE 0.4 MG/1
0.4 CAPSULE ORAL DAILY
Qty: 30 CAPSULE | Refills: 11 | Status: SHIPPED | OUTPATIENT
Start: 2024-01-26

## 2024-01-26 RX ADMIN — IOHEXOL 69 ML: 350 INJECTION, SOLUTION INTRAVENOUS at 09:51

## 2024-02-03 DIAGNOSIS — I10 ESSENTIAL (PRIMARY) HYPERTENSION: ICD-10-CM

## 2024-02-05 RX ORDER — LISINOPRIL 40 MG/1
TABLET ORAL
Qty: 90 TABLET | Refills: 1 | Status: SHIPPED | OUTPATIENT
Start: 2024-02-05

## 2024-02-26 ENCOUNTER — TELEPHONE (OUTPATIENT)
Dept: PRIMARY CARE | Facility: CLINIC | Age: 69
End: 2024-02-26
Payer: COMMERCIAL

## 2024-02-28 DIAGNOSIS — M17.10 UNILATERAL PRIMARY OSTEOARTHRITIS, UNSPECIFIED KNEE: ICD-10-CM

## 2024-02-28 DIAGNOSIS — N52.9 MALE ERECTILE DYSFUNCTION, UNSPECIFIED: ICD-10-CM

## 2024-02-28 DIAGNOSIS — E78.5 DYSLIPIDEMIA: ICD-10-CM

## 2024-02-28 RX ORDER — DULOXETIN HYDROCHLORIDE 60 MG/1
60 CAPSULE, DELAYED RELEASE ORAL DAILY
Qty: 90 CAPSULE | Refills: 0 | Status: SHIPPED | OUTPATIENT
Start: 2024-02-28 | End: 2024-05-28

## 2024-02-28 RX ORDER — GABAPENTIN 300 MG/1
300 CAPSULE ORAL 2 TIMES DAILY
Qty: 180 CAPSULE | Refills: 0 | Status: SHIPPED | OUTPATIENT
Start: 2024-02-28

## 2024-02-29 RX ORDER — ROSUVASTATIN CALCIUM 20 MG/1
20 TABLET, COATED ORAL DAILY
Qty: 90 TABLET | Refills: 0 | Status: SHIPPED | OUTPATIENT
Start: 2024-02-29 | End: 2024-05-28

## 2024-03-20 ENCOUNTER — APPOINTMENT (OUTPATIENT)
Dept: DERMATOLOGY | Facility: CLINIC | Age: 69
End: 2024-03-20
Payer: COMMERCIAL

## 2024-03-25 ENCOUNTER — OFFICE VISIT (OUTPATIENT)
Dept: UROLOGY | Facility: CLINIC | Age: 69
End: 2024-03-25
Payer: MEDICARE

## 2024-03-25 VITALS — HEIGHT: 72 IN | BODY MASS INDEX: 34.54 KG/M2 | WEIGHT: 255 LBS | TEMPERATURE: 97.3 F

## 2024-03-25 DIAGNOSIS — R97.20 ELEVATED PROSTATE SPECIFIC ANTIGEN (PSA): Primary | ICD-10-CM

## 2024-03-25 DIAGNOSIS — Z12.5 PROSTATE CANCER SCREENING: ICD-10-CM

## 2024-03-25 DIAGNOSIS — C64.9 RENAL CELL CARCINOMA, UNSPECIFIED LATERALITY (MULTI): ICD-10-CM

## 2024-03-25 PROCEDURE — 1159F MED LIST DOCD IN RCRD: CPT | Performed by: STUDENT IN AN ORGANIZED HEALTH CARE EDUCATION/TRAINING PROGRAM

## 2024-03-25 PROCEDURE — 1126F AMNT PAIN NOTED NONE PRSNT: CPT | Performed by: STUDENT IN AN ORGANIZED HEALTH CARE EDUCATION/TRAINING PROGRAM

## 2024-03-25 PROCEDURE — G2211 COMPLEX E/M VISIT ADD ON: HCPCS | Performed by: STUDENT IN AN ORGANIZED HEALTH CARE EDUCATION/TRAINING PROGRAM

## 2024-03-25 PROCEDURE — 4010F ACE/ARB THERAPY RXD/TAKEN: CPT | Performed by: STUDENT IN AN ORGANIZED HEALTH CARE EDUCATION/TRAINING PROGRAM

## 2024-03-25 PROCEDURE — 1160F RVW MEDS BY RX/DR IN RCRD: CPT | Performed by: STUDENT IN AN ORGANIZED HEALTH CARE EDUCATION/TRAINING PROGRAM

## 2024-03-25 PROCEDURE — 99214 OFFICE O/P EST MOD 30 MIN: CPT | Performed by: STUDENT IN AN ORGANIZED HEALTH CARE EDUCATION/TRAINING PROGRAM

## 2024-03-25 ASSESSMENT — PAIN SCALES - GENERAL: PAINLEVEL: 0-NO PAIN

## 2024-03-25 NOTE — PROGRESS NOTES
Scribed for Dr. Brennan Barkley by Hansel Bishop. I, Dr. Brennan Barkley have personally reviewed and agreed with the information entered by the Virtual Scribe. 03/25/24.    ASSESSMENT:  Problem List Items Addressed This Visit          Genitourinary and Reproductive    Elevated prostate specific antigen (PSA) - Primary    Relevant Orders    PSA     Other Visit Diagnoses       Prostate cancer screening        Relevant Orders    PSA    Renal cell carcinoma, unspecified laterality (CMS/HCC)        Relevant Orders    US renal complete          BPH with LUTS - s/p HoLEP (2017) w/ Dr. Martinez, continues on tamsulosin.  Renal Cyst, LEFT  Hx of RCC - s/p biopsy/cryoablation (2005)  Erectile dysfunction - follows up with Dr. Israel, on 100mg viagra PRN.  PSA screening - last 1.43 (Sept 2023)  Hx of microhematuria - s/p negative work-up  History hip replacement with prosthesis - c/b infection/abscess, since resolved.    PLAN:  #OAB symptoms.   Reports a good response to trospium ER.   Urgency and post-void dribbling improved.   Stream somewhat slower, otherwise no side-effects.   Wishes to continue, refill provided.     #Renal cyst  We discussed the natural history of simple renal cysts.  Reassured that these are very common and benign.   I would not recommend further workup or surveillance for this.  Patient reassured.     #Hx of RCC  Repeat a renal ultrasound in 1 year for continued surveillance and ongoing care of these chronic medical conditions  RTC in 1 year for reassessment and review results.     All questions were answered to the patient’s satisfaction.  Patient agrees with the plan and wishes to proceed.  Continue follow-up for ongoing care of his chronic medical conditions.       History of Present Illness (HPI):  Sarthak presents for a follow up visit.  The patient’s EMR has been reviewed.  Lives in [place], [place].  Previously seen by Dr. Elizalde, Dr. Martinez and Dr. Israel.     History of BPH with LUTS, s/p  Kika (2017) w/ Dr. Martinez, continues on tamsulosin,   H/o RCC s/p biopsy and cryoablation (2005); renal cyst (LEFT), erectile dysfunction followed and treated by Dr. Israel, prescribed 100mg viagra in the past.     Latest PSA was stable at 1.43 (Sept 2023).  NIDDM ; POC Hgb A1c 8.2% (Sept 2023).  Splenic artery aneurysm - declined pursuing in the past.    TODAY: (03/25/24)  Last visit, provided trial of trospium.   Reports some benefit with this.  Denotes improvement of his urgency primarily.   As well as some improvement of his post-void dribbling.  Albeit, reports some slowing of his stream.  Otherwise, no significant side-effects.   C/o persistent frequency as well during the day.   Denies any recent infections, gross hematuria, fevers or chills.   Latest PSA 1.43 (Sept 2023).    TO REVIEW: Initial visit (02/13/23)  Reports persistent urinary frequency q1-1.5h.  As well as persistent post-void dribbling.   Denies any gross hematuria, or UTIs. Acknowledges being treated with tamsulosin in the past as well as oxybutynin. Notes that he discontinued oxybutynin use 2/2 developing headaches however he does state that this helped with is post-void dribbling and frequency in the past. He would like to try an alternative medication to oxybutynin today.  mL.      Personally reviewed the patient's labs and imaging, his last CT chest abd pelvis without contrast was (April 2022) which demonstrated a calcified cystic mass upper pole left kidney. Appearance is unchanged. Tiny bilateral nonobstructive renal calculi. Prostate gland enlargement.     Past Medical History:   Diagnosis Date    Chronic obstructive pulmonary disease, unspecified (CMS/HCC) 11/21/2022    COPD (chronic obstructive pulmonary disease)    Sleep apnea, unspecified 01/29/2021    Sleep apnea     Past Surgical History:   Procedure Laterality Date    APPENDECTOMY  04/04/2013    Appendectomy    CT HEAD ANGIO W AND WO IV CONTRAST  11/28/2018    CT  HEAD ANGIO W AND WO IV CONTRAST 11/28/2018 [facility]    KNEE SURGERY  03/14/2020    Knee Surgery    OTHER SURGICAL HISTORY  04/04/2013    Kidney Surgery Laparoscopic Ablation Of Renal Mass Lesion(S)    OTHER SURGICAL HISTORY  04/04/2013    Uvulectomy    OTHER SURGICAL HISTORY  03/14/2020    Back surgery    OTHER SURGICAL HISTORY  03/14/2020    Hip replacement    OTHER SURGICAL HISTORY  03/14/2020    Spinal Neurostimulator Pulse Generator    TONSILLECTOMY  04/04/2013    Tonsillectomy    UMBILICAL HERNIA REPAIR  04/04/2013    Umbilical Hernia Repair    US GUIDED ABSCESS FLUID COLLECTION DRAINAGE  8/19/2020    US GUIDED ABSCESS FLUID COLLECTION DRAINAGE 8/19/2020 [facility]     Family History   Problem Relation Name Age of Onset    Pancreatic cancer Mother       Social History     Tobacco Use   Smoking Status Every Day    Packs/day: 1    Types: Cigarettes   Smokeless Tobacco Never     Current Outpatient Medications   Medication Sig Dispense Refill    albuterol (Ventolin HFA) 90 mcg/actuation inhaler Inhale 2 puffs. 4-6 hrs prn      albuterol 2.5 mg /3 mL (0.083 %) nebulizer solution USE 1 UNIT DOSE EVERY 4-6 HOURS AS NEEDED FOR WHEEZING .      alprostadil (Muse) 500 mcg pellet Insert 1 suppository as needed, no more than 1 per day      amoxicillin (Amoxil) 875 mg tablet Take 1 tablet (875 mg) by mouth 2 times a day with meals. 90 tablet 3    BinaxNOW COVID-19 Ag Self Test kit USE AS DIRECTED      budesonide-formoteroL (Symbicort) 160-4.5 mcg/actuation inhaler Inhale 2 puffs 2 times a day. RINSE MOUTH AFTER USE 10.2 each 3    cetirizine (ZyrTEC) 5 mg tablet Take 1 tablet (5 mg) by mouth once daily in the evening.      doxycycline (Periostat) 20 mg tablet Take 1 tablet (20 mg) by mouth. WITH FOOD AND WATER      DULoxetine (Cymbalta) 60 mg DR capsule Take 1 capsule (60 mg) by mouth once daily. 90 capsule 0    furosemide (Lasix) 40 mg tablet TAKE 1 TABLET BY MOUTH EVERY DAY 90 tablet 0    gabapentin  (Neurontin) 300 mg capsule Take 1 capsule (300 mg) by mouth 2 times a day. 180 capsule 0    Jardiance 10 mg TAKE 1 TABLET BY MOUTH EVERY DAY 90 tablet 1    lisinopril 40 mg tablet TAKE 1 TABLET BY MOUTH EVERY DAY 90 tablet 1    metroNIDAZOLE (Metrogel) 0.75 % gel Apply topically 2 times a day.      omeprazole (PriLOSEC) 20 mg DR capsule TAKE 1 CAPSULE BY MOUTH EVERY DAY 90 capsule 1    oxybutynin XL (Ditropan-XL) 10 mg 24 hr tablet Take 1 tablet (10 mg) by mouth once daily.      polyethylene glycol (Glycolax, Miralax) 17 gram/dose powder Take as directed      polyethylene glycol-electrolytes 420 gram solution TAKE AS DIRECTED.      predniSONE (Deltasone) 20 mg tablet Take 1 tablet (20 mg) by mouth 2 times a day.      rosuvastatin (Crestor) 20 mg tablet TAKE 1 TABLET BY MOUTH EVERY DAY 90 tablet 0    sildenafil (Viagra) 100 mg tablet Take 1 tablet (100 mg) by mouth. 1 hour prior to sexual intercourse      tamsulosin (Flomax) 0.4 mg 24 hr capsule Take 1 capsule (0.4 mg) by mouth once daily. 30 capsule 11    testosterone 20.25 mg/1.25 gram (1.62 %) gel in metered-dose pump 1 Pump once daily. Each shoulder      trospium (Sanctura XR) 60 mg 24 hour capsule Take 1 capsule (60 mg) by mouth once daily.       No current facility-administered medications for this visit.     Allergies   Allergen Reactions    Adhesive Tape-Silicones Unknown    Iodine Other    Tramadol Hives and Rash     Past medical, surgical, family and social history in the chart was reviewed and is accurate including any additions to what is in this HPI.    REVIEW OF SYSTEMS (ROS):   Constitutional: denies any unintentional weight loss or change in strength.  Integumentary: denies any rashes or pruritus.  Eyes: denies any double vision or eye pain.  Ear/Nose/Mouth/Throat: denies any nosebleeds or gum bleeds.  Cardiovascular: denies any chest pain or syncope.  Respiratory: denies hemoptysis.  Gastrointestinal: denies nausea or vomiting.  Musculoskeletal:  denies muscle cramping or weakness.  Neurologic: denies convulsions or seizures.  Hematologic/Lymphatic: denies bleeding tendencies.  Endocrine: denies heat/cold intolerance.  All other systems have been reviewed and are negative unless otherwise noted in the HPI.     OBJECTIVE:  Visit Vitals  Temp 36.3 °C (97.3 °F)     PHYSICAL EXAM:  Constitutional: No obvious distress.  Eyes: Non-injected conjunctiva, sclera clear, EOMI.  Ears/Nose/Mouth/Throat: No obvious drainage per ears or nose.  Cardiovascular: Extremities are warm and well perfused. No edema, cyanosis or pallor.  Respiratory: No audible wheezing/stridor; respirations do not appear labored.  Gastrointestinal: Abdomen soft, not distended.  Musculoskeletal: Normal ROM of extremities.  Skin: No obvious rashes or open sores.  Neurologic: Alert and oriented, CN 2-12 grossly intact.  Psychiatric: Answers questions appropriately with normal affect.  Hematologic/Lymphatic/Immunologic: No obvious bruises or sites of spontaneous bleeding.  Genitourinary: No CVA tenderness, bladder not palpable.     LABS & IMAGING:  Lab Results   Component Value Date    WBC 6.0 09/01/2023    HGB 15.8 09/01/2023    HCT 49.0 09/01/2023     09/01/2023    CHOL 172 09/01/2023    TRIG 427 (H) 09/01/2023    HDL 35.0 (A) 09/01/2023    ALT 47 09/01/2023    AST 24 09/01/2023     09/01/2023    K 5.0 09/01/2023     09/01/2023    CREATININE 1.14 01/24/2024    BUN 13 09/01/2023    CO2 29 09/01/2023    TSH 1.03 01/29/2021    PSA 1.43 09/01/2023    INR 1.1 08/18/2020    HGBA1C 8.2 (A) 09/01/2023     Scribed for Dr. Brennan Barkley by Hansel Bishop.  I, Dr. Brennan Barkley have personally reviewed and agreed with the information entered by the Virtual Scribe. 03/25/24.

## 2024-04-26 ENCOUNTER — OFFICE VISIT (OUTPATIENT)
Dept: DERMATOLOGY | Facility: CLINIC | Age: 69
End: 2024-04-26
Payer: MEDICARE

## 2024-04-26 DIAGNOSIS — D18.01 CHERRY ANGIOMA: ICD-10-CM

## 2024-04-26 DIAGNOSIS — D48.5 NEOPLASM OF UNCERTAIN BEHAVIOR OF SKIN: ICD-10-CM

## 2024-04-26 DIAGNOSIS — L57.8 SUN-DAMAGED SKIN: ICD-10-CM

## 2024-04-26 DIAGNOSIS — L71.9 ROSACEA: Primary | ICD-10-CM

## 2024-04-26 DIAGNOSIS — L29.9 PRURITUS: ICD-10-CM

## 2024-04-26 DIAGNOSIS — L82.1 SEBORRHEIC KERATOSES: ICD-10-CM

## 2024-04-26 DIAGNOSIS — L90.5 SCAR CONDITIONS AND FIBROSIS OF SKIN: ICD-10-CM

## 2024-04-26 DIAGNOSIS — D22.9 MULTIPLE BENIGN MELANOCYTIC NEVI: ICD-10-CM

## 2024-04-26 DIAGNOSIS — L91.8 SKIN TAG: ICD-10-CM

## 2024-04-26 DIAGNOSIS — D48.9 NEOPLASM OF UNCERTAIN BEHAVIOR: ICD-10-CM

## 2024-04-26 PROCEDURE — 99214 OFFICE O/P EST MOD 30 MIN: CPT | Performed by: DERMATOLOGY

## 2024-04-26 PROCEDURE — 1159F MED LIST DOCD IN RCRD: CPT | Performed by: DERMATOLOGY

## 2024-04-26 PROCEDURE — 1160F RVW MEDS BY RX/DR IN RCRD: CPT | Performed by: DERMATOLOGY

## 2024-04-26 PROCEDURE — 11102 TANGNTL BX SKIN SINGLE LES: CPT | Performed by: DERMATOLOGY

## 2024-04-26 PROCEDURE — 88305 TISSUE EXAM BY PATHOLOGIST: CPT | Performed by: DERMATOLOGY

## 2024-04-26 PROCEDURE — 4010F ACE/ARB THERAPY RXD/TAKEN: CPT | Performed by: DERMATOLOGY

## 2024-04-26 ASSESSMENT — DERMATOLOGY QUALITY OF LIFE (QOL) ASSESSMENT
ARE THERE EXCLUSIONS OR EXCEPTIONS FOR THE QUALITY OF LIFE ASSESSMENT: NO
RATE HOW EMOTIONALLY BOTHERED YOU ARE BY YOUR SKIN PROBLEM (FOR EXAMPLE, WORRY, EMBARRASSMENT, FRUSTRATION): 2
RATE HOW BOTHERED YOU ARE BY EFFECTS OF YOUR SKIN PROBLEMS ON YOUR ACTIVITIES (EG, GOING OUT, ACCOMPLISHING WHAT YOU WANT, WORK ACTIVITIES OR YOUR RELATIONSHIPS WITH OTHERS): 0 - NEVER BOTHERED
DID YOU DOCUMENT A PATIENT REASON(S) FOR NOT USING THE QUALITY OF LIFE ASSESSMENT: NO
DATE THE QUALITY-OF-LIFE ASSESSMENT WAS COMPLETED: 66956
RATE HOW BOTHERED YOU ARE BY SYMPTOMS OF YOUR SKIN PROBLEM (EG, ITCHING, STINGING BURNING, HURTING OR SKIN IRRITATION): 4
DID THE PATIENT HAVE A SEVERE MENTAL AND/OR PHYSICAL INCAPACITY THAT PREVENTED HIM OR HER FROM COMPLETING THE ASSESSMENT: NO
WAS THE PATIENT DIAGNOSED WITH A SKIN CONDITION THAT IS INCLUDED IN THE DENOMINATOR DEFINITION (E.G.PSORIASIS, DERMATITIS) BUT IDENTIFIED A SKIN CONDITION THAT IS NOT (E.G. MELANOMA, NEVI) THE MAIN CONDITION ON THEIR ASSESSMENT: NO

## 2024-04-26 ASSESSMENT — DERMATOLOGY PATIENT ASSESSMENT
HAVE YOU HAD OR DO YOU HAVE A STAPH INFECTION: NO
DO YOU USE SUNSCREEN: OCCASIONALLY
DO YOU HAVE ANY NEW OR CHANGING LESIONS: YES
HAVE YOU HAD OR DO YOU HAVE VASCULAR DISEASE: NO
DO YOU USE A TANNING BED: NO
ARE YOU AN ORGAN TRANSPLANT RECIPIENT: NO

## 2024-04-26 ASSESSMENT — ITCH NUMERIC RATING SCALE: HOW SEVERE IS YOUR ITCHING?: 0

## 2024-04-26 ASSESSMENT — PATIENT GLOBAL ASSESSMENT (PGA): PATIENT GLOBAL ASSESSMENT: PATIENT GLOBAL ASSESSMENT:  3 - MODERATE

## 2024-04-26 NOTE — PROGRESS NOTES
Subjective     Sarthak Portillo is a 69 y.o. male who presents for the following: Skin Check.     Skin Cancer History  No skin cancer on file.    Review of Systems:  No other skin or systemic complaints other than what is documented elsewhere in the note.    The following portions of the chart were reviewed this encounter and updated as appropriate:       Specialty Problems          Dermatology Problems    Actinic keratosis    Erythema intertrigo    Hemangioma of skin and subcutaneous tissue    Melanocytic nevi of other parts of face    Melanocytic nevi of trunk    Melanocytic nevi of unspecified lower limb, including hip    Melanocytic nevi of unspecified upper limb, including shoulder    Neoplasm of uncertain behavior of skin    Other melanin hyperpigmentation    Other seborrheic keratosis    Rhinophyma    Rosacea, unspecified    Scar condition and fibrosis of skin    Skin changes due to chronic exposure to nonionizing radiation, unspecified     Past Medical History:  Sarthak Portillo  has a past medical history of Chronic obstructive pulmonary disease, unspecified (Multi) (11/21/2022) and Sleep apnea, unspecified (01/29/2021).    Past Surgical History:  Sarthak Portillo  has a past surgical history that includes Tonsillectomy (04/04/2013); Other surgical history (04/04/2013); Appendectomy (04/04/2013); Other surgical history (04/04/2013); Umbilical hernia repair (04/04/2013); Other surgical history (03/14/2020); Other surgical history (03/14/2020); Knee surgery (03/14/2020); Other surgical history (03/14/2020); US guided abscess fluid collection drainage (8/19/2020); and CT angio head w and wo IV contrast (11/28/2018).    Family History:  Patient family history includes Pancreatic cancer in his mother.    Social History:  Sarthak Portillo  reports that he has been smoking cigarettes. He has never used smokeless tobacco. No history on file for alcohol use and drug use.    Allergies:  Adhesive tape-silicones, Iodine, and  Tramadol    Current Medications / CAM's:    Current Outpatient Medications:     albuterol (Ventolin HFA) 90 mcg/actuation inhaler, Inhale 2 puffs. 4-6 hrs prn, Disp: , Rfl:     albuterol 2.5 mg /3 mL (0.083 %) nebulizer solution, USE 1 UNIT DOSE EVERY 4-6 HOURS AS NEEDED FOR WHEEZING ., Disp: , Rfl:     alprostadil (Muse) 500 mcg pellet, Insert 1 suppository as needed, no more than 1 per day, Disp: , Rfl:     amoxicillin (Amoxil) 875 mg tablet, Take 1 tablet (875 mg) by mouth 2 times a day with meals., Disp: 90 tablet, Rfl: 3    budesonide-formoteroL (Symbicort) 160-4.5 mcg/actuation inhaler, Inhale 2 puffs 2 times a day. RINSE MOUTH AFTER USE, Disp: 10.2 each, Rfl: 3    cetirizine (ZyrTEC) 5 mg tablet, Take 1 tablet (5 mg) by mouth once daily in the evening., Disp: , Rfl:     doxycycline (Periostat) 20 mg tablet, Take 1 tablet (20 mg) by mouth. WITH FOOD AND WATER, Disp: , Rfl:     DULoxetine (Cymbalta) 60 mg DR capsule, Take 1 capsule (60 mg) by mouth once daily., Disp: 90 capsule, Rfl: 0    furosemide (Lasix) 40 mg tablet, TAKE 1 TABLET BY MOUTH EVERY DAY, Disp: 90 tablet, Rfl: 0    gabapentin (Neurontin) 300 mg capsule, Take 1 capsule (300 mg) by mouth 2 times a day., Disp: 180 capsule, Rfl: 0    Jardiance 10 mg, TAKE 1 TABLET BY MOUTH EVERY DAY, Disp: 90 tablet, Rfl: 1    lisinopril 40 mg tablet, TAKE 1 TABLET BY MOUTH EVERY DAY, Disp: 90 tablet, Rfl: 1    metroNIDAZOLE (Metrogel) 0.75 % gel, Apply topically 2 times a day., Disp: , Rfl:     omeprazole (PriLOSEC) 20 mg DR capsule, TAKE 1 CAPSULE BY MOUTH EVERY DAY, Disp: 90 capsule, Rfl: 1    oxybutynin XL (Ditropan-XL) 10 mg 24 hr tablet, Take 1 tablet (10 mg) by mouth once daily., Disp: , Rfl:     polyethylene glycol (Glycolax, Miralax) 17 gram/dose powder, Take as directed, Disp: , Rfl:     polyethylene glycol-electrolytes 420 gram solution, TAKE AS DIRECTED., Disp: , Rfl:     predniSONE (Deltasone) 20 mg tablet, Take 1 tablet (20 mg) by mouth 2 times a day.,  Disp: , Rfl:     rosuvastatin (Crestor) 20 mg tablet, TAKE 1 TABLET BY MOUTH EVERY DAY, Disp: 90 tablet, Rfl: 0    sildenafil (Viagra) 100 mg tablet, Take 1 tablet (100 mg) by mouth. 1 hour prior to sexual intercourse, Disp: , Rfl:     tamsulosin (Flomax) 0.4 mg 24 hr capsule, Take 1 capsule (0.4 mg) by mouth once daily., Disp: 30 capsule, Rfl: 11    testosterone 20.25 mg/1.25 gram (1.62 %) gel in metered-dose pump, 1 Pump once daily. Each shoulder, Disp: , Rfl:     trospium (Sanctura XR) 60 mg 24 hour capsule, Take 1 capsule (60 mg) by mouth once daily., Disp: , Rfl:     BinaxNOW COVID-19 Ag Self Test kit, USE AS DIRECTED, Disp: , Rfl:      Objective   Well appearing patient in no apparent distress; mood and affect are within normal limits.    A full examination was performed including scalp, head, eyes, ears, nose, lips, neck, chest, axillae, abdomen, back, buttocks, bilateral upper extremities, bilateral lower extremities, hands, feet, fingers, toes, fingernails, and toenails. All findings within normal limits unless otherwise noted below.    - scattered regular brown macules and papules    - Scattered waxy tan/grey/brown papules with horn cysts    - scattered small bright red papules and macules    - Well healed scar at prior treatment sites without visual or palpable evidence of recurrence.     - scattered tan macules, telangiectasias, and general photo-damage    Pruritus in areas of actinic damage on scalp, shoulders    Head - Anterior (Face), Nose  Rhinophymatous nose and scattered telangiectasias    Right Parotid Area  Right cheek eroded pink 3mm papule          Left Nasal Vestibule  Left intranasal with difficult to fully visualize verrucous papule, about 4mm    Right Medial Thigh  Pedunculated papules         Assessment/Plan   Rosacea (2)  Head - Anterior (Face); Nose    Rosacea with papulopustular component (mild) and notable rhinophymatous and telangiectatic component  - Continue metrogel bid  - continue  strict UV protection daily  - encouraged smoking cessation as worsening the skin changes noted above  - notes that doxycycline PO was not tolerated due to loose stools, declines systemic therapy and would like to see one of our skin surgeons to consider rhinophyma surgery, referral to Dr. Eulogio Jacobson ordered and his staff will reach out in 1-2 weeks to sched pt.     Related Procedures  Referral to Dermatology - Mohs Surgery    Neoplasm of uncertain behavior of skin  Right Parotid Area    Lesion biopsy  Type of biopsy: tangential    Informed consent: discussed and consent obtained    Timeout: patient name, date of birth, surgical site, and procedure verified    Procedure prep:  Patient was prepped and draped  Anesthesia: the lesion was anesthetized in a standard fashion    Anesthetic:  1% lidocaine w/ epinephrine 1-100,000 local infiltration  Instrument used: DermaBlade    Hemostasis achieved with: aluminum chloride    Outcome: patient tolerated procedure well    Post-procedure details: sterile dressing applied and wound care instructions given    Dressing type: petrolatum and bandage      Staff Communication: Dermatology Local Anesthesia: Site Location: 1mL    Specimen 1 - Dermatopathology- DERM LAB  Differential Diagnosis: bcc vs rosacea  Check Margins Yes/No?:    Comments:    Dermpath Lab: Routine Histopathology (formalin-fixed tissue)    Neoplasm of uncertain behavior  Left Nasal Vestibule    Given the location and difficulty visualizing, I asked him to get in for a fuv with his ENT (has one for prior surgery on right neck. He endorses that he will do this for eval/removal with ENT.     He also notes prior ENT surgery scar on right neck bothers him from time to time, gets ingrown hairs. Asked that he have that eval with his ENT as well and consider scar revision with that physician. He endorses that he will do this.     Multiple benign melanocytic nevi    Benign melanocytic nevi  - Discussed benign nature and  that no treatment is necessary unless it becomes painful or increases in size. Patient opts for clinical monitoring at this time.    - Sun protective behavior reviewed and encouraged including the use of over-the-counter sunscreen with SPF30+ daily (reapply every 1.5 hours when outdoors), UPF clothing, broad rimmed hats, sunglasses, and avoidance of midday sun. Home skin monitoring encouraged and how to monitor for skin cancer (changing or new moles, new rapidly growing or non-healing lesions) reviewed. Patient encouraged to call with interval concerns or changes.      Seborrheic keratoses    Seborrheic keratosis (-es)  - Discussed benign nature and that no treatment is necessary unless it becomes painful or increases in size. Patient opts for clinical monitoring at this time.      Cherry angioma    Cherry angioma(s)  - Discussed benign nature and that no treatment is necessary unless it becomes painful or increases in size. Patient opts for clinical monitoring at this time.      Scar conditions and fibrosis of skin    - Well healed scar(s) at sites of prior skin cancer- h/o basal cell carcinoma right lower leg 2023 s/p curettage  - Sun protective behavior reviewed and encouraged including the use of over-the-counter sunscreen with SPF30+ daily (reapply every 1.5 hours when outdoors), UPF clothing, broad rimmed hats, sunglasses, and avoidance of midday sun. Home skin monitoring encouraged and how to monitor for skin cancer (changing or new moles, new rapidly growing or non-healing lesions) reviewed. Patient encouraged to call with interval concerns or changes.       Related Procedures  Follow Up In Dermatology - Established Patient    Sun-damaged skin    Actinically damaged skin-  - Sun protective behavior reviewed and encouraged including the use of over-the-counter sunscreen with SPF30+ daily (reapply every 1.5 hours when outdoors), UPF clothing, broad rimmed hats, sunglasses, and avoidance of midday sun. Home  skin monitoring encouraged and how to monitor for skin cancer (changing or new moles, new rapidly growing or non-healing lesions) reviewed. Patient encouraged to call with interval concerns or changes.      Pruritus    Start Sarna sensitive skin 2-4x daily    For areas of chaffing in axillae and antecubital fossae, allow to fully dry after bathing then purchas glide stick for men and use generously in these areas daily.     No signs of intertrigo today.     Skin tag  Right Medial Thigh    - Discussed benign nature and that no treatment is necessary unless it becomes painful or increases in size. Patient opts for clinical monitoring at this time.      - offered cosmetic removal, which he declines as would need to schedule at a cosmetic visit in future.      Fuv 9 mos fbse  Meredith Sparks MD

## 2024-04-30 LAB
LABORATORY COMMENT REPORT: NORMAL
PATH REPORT.FINAL DX SPEC: NORMAL
PATH REPORT.GROSS SPEC: NORMAL
PATH REPORT.MICROSCOPIC SPEC OTHER STN: NORMAL
PATH REPORT.RELEVANT HX SPEC: NORMAL
PATH REPORT.TOTAL CANCER: NORMAL

## 2024-05-01 DIAGNOSIS — Z00.00 ENCOUNTER FOR GENERAL ADULT MEDICAL EXAMINATION WITHOUT ABNORMAL FINDINGS: ICD-10-CM

## 2024-05-02 DIAGNOSIS — C44.310 BASAL CELL CARCINOMA, FACE: Primary | ICD-10-CM

## 2024-05-02 RX ORDER — FUROSEMIDE 40 MG/1
TABLET ORAL
Qty: 30 TABLET | Refills: 0 | Status: SHIPPED | OUTPATIENT
Start: 2024-05-02 | End: 2024-05-28

## 2024-05-15 ENCOUNTER — APPOINTMENT (OUTPATIENT)
Dept: DERMATOLOGY | Facility: CLINIC | Age: 69
End: 2024-05-15
Payer: COMMERCIAL

## 2024-05-22 ENCOUNTER — PROCEDURE VISIT (OUTPATIENT)
Dept: DERMATOLOGY | Facility: CLINIC | Age: 69
End: 2024-05-22
Payer: MEDICARE

## 2024-05-22 VITALS — HEART RATE: 79 BPM | DIASTOLIC BLOOD PRESSURE: 73 MMHG | SYSTOLIC BLOOD PRESSURE: 132 MMHG

## 2024-05-22 DIAGNOSIS — C44.319 BASAL CELL CARCINOMA (BCC) OF SKIN OF OTHER PART OF FACE: ICD-10-CM

## 2024-05-22 PROCEDURE — 13132 CMPLX RPR F/C/C/M/N/AX/G/H/F: CPT | Performed by: DERMATOLOGY

## 2024-05-22 PROCEDURE — 99214 OFFICE O/P EST MOD 30 MIN: CPT | Performed by: DERMATOLOGY

## 2024-05-22 PROCEDURE — 17311 MOHS 1 STAGE H/N/HF/G: CPT | Performed by: DERMATOLOGY

## 2024-05-22 NOTE — PROGRESS NOTES
Office Visit Note  Date: 5/22/2024  Surgeon:  Preston Cadena MD  Office Location: [address]  [facility] [address]  Dept: [phone]  Dept Fax: [phone]  Referring Provider: Meredith Sparks MD  [id number]  [place] [address]    Subjective   Sarthak Portillo is a 69 y.o. male who presents for the following: MOHS Surgery    According to the patient, the lesion has been present for approximately 6 months at the time of diagnosis.  The lesion is not causing symptoms.  The lesion has not been treated previously.    The patient does not have a pacemaker / defibrillator.  The patient does have a heart valve / joint replacement.    The patient is on blood thinners.  The patient does not have a history of hepatitis B or C.  The patient does not have a history of HIV.  The patient does not have a history of immunosuppression (e.g. organ transplantation, malignancy, medications)    Review of Systems:  No other skin or systemic complaints other than what is documented elsewhere in the note.    MEDICAL HISTORY: clinically relevant history including significant past medical history, medications and allergies was reviewed and documented in Epic.    Objective   Well appearing patient in no apparent distress; mood and affect are within normal limits.  Vital signs: See record.  Noted on the Right Parotid Area  Is a 0.8 x 0.6 cm scar        The patient confirmed the identified site.    Discussion:  The nature of the diagnosis was explained. The lesion is a skin cancer.  It has a risk of local growth and distant spread. The condition is associated with sun exposure.  Warning signs of non-melanoma skin cancer discussed. Patient was instructed to perform monthly self skin examination.  We recommended that the patient have regular full skin exams given an increased risk of subsequent skin cancers. The patient was instructed to use sun protective  behaviors including use of broad spectrum sunscreens and sun protective clothing to reduce risk of skin cancers.      Risks, benefits, side effects of Mohs surgery were discussed with patient and the patient voiced understanding.  It was explained that even though the cure rate of Mohs is very high it is not 100%. Risks of surgery including but not limited to bleeding, infection, numbness, nerve damage, and scar were reviewed.  Discussion included wound care requirements, activity restrictions, likely scar outcome and time to heal.     After Mohs surgery, the defect may need to be repaired surgically and the scar may be longer than the original lesion.  Reconstruction options, risks, and benefits were reviewed including second intention healing, linear repair (4-1 ratio was explained), local flaps, skin grafts, cartilage grafts and interpolation flaps (the need for multiple surgeries was explained). Possible outcomes were reviewed including likely scar appearance, failure of flap survival, infection, bleeding and the need for revision surgery.     The pathology was reviewed, the photograph was reviewed, and the referring physician's note was reviewed.    Patient elected for Mohs surgery.    The patient has a basal cell carcinoma.  The pathology was reviewed, the photograph was reviewed, and the referring physicians note was reviewed.  Multiple treatment options including mohs surgery (which has moderate risk of morbidity) were reviewed.    Medical Decision Making  Column 1- Basal Cell Carcinoma (1 acute uncomplicated illness- Low)  Column 2- 3 tests reviewed (pathology, photograph, refering physician notes- Moderate)  Column 3- Modertate risk of morbidity from additional treatment- mohs surgery- Moderate)    Overall Moderate MDM

## 2024-05-22 NOTE — PROGRESS NOTES
Mohs Surgery Operative Note    Date of Surgery:  5/22/2024  Surgeon:  Preston Cadena MD  Office Location: [address]   [address] [address]  Dept: [phone]  Dept Fax: [phone]  Referring Provider: Meredith Sparks MD  [id number] [facility]  [place] [address]      Assessment/Plan   Pre-procedure:   Obtained informed consent: written from patient  The surgical site was identified and confirmed with the patient.     Intra-operative:   Audible time out called at : 8:48 AM 05/22/24  by: Namita Bell MA   Verified patient name, birthdate, site, specimen bottle label & requisition.    The planned procedure(s) was again reviewed with the patient. The risks of bleeding, infection, nerve damage and scarring were reviewed. Written authorization was obtained. The patient identity, surgical site, and planned procedure(s) were verified. The provider acted as both surgeon and pathologist.     Basal cell carcinoma (BCC) of skin of other part of face  Right Parotid Area  Mohs surgery  Consent obtained: written    Universal Protocol:  Procedure explained and questions answered to patient or proxy's satisfaction: Yes    Test results available and properly labeled: Yes    Pathology report reviewed: Yes    External notes reviewed: Yes    Photo or diagram used for site identification: Yes    Site/side marked: Yes    Slide independently reviewed by Mohs surgeon: Yes    Immediately prior to procedure a time out was called: Yes    Patient identity confirmed: verbally with patient  Preparation: Patient was prepped and draped in usual sterile fashion      Anticoagulation:  Is the patient taking prescription anticoagulant and/or aspirin prescribed/recommended by a physician? Yes    Was the anticoagulation regimen changed prior to Mohs? No      Anesthesia:  Anesthesia method: local infiltration  Local anesthetic: lidocaine 1% WITH epi    Procedure  Details:  Biopsy accession number: [id number]  Date of biopsy: 4/26/2024  Pre-Op diagnosis: basal cell carcinoma  BCC subtype: nodular  Surgery side: right  Surgical site (from skin exam): Right Parotid Area  Pre-operative length (cm): 0.8  Pre-operative width (cm): 0.6  Indications for Mohs surgery: anatomic location where tissue conservation is critical  Previously treated? No      Micrographic Surgery Details:  Post-operative length (cm): 1.3  Post-operative width (cm): 0.9  Number of Mohs stages: 1    Stage 1     Comments: The patient was brought into the operating room and placed in the procedure chair in the appropriate position.  The area positive by previous biopsy was identified and confirmed with the patient. The area of clinically obvious tumor was debulked using a curette and/or scalpel as needed. An incision was made following the Mohs approach through the skin. The specimen was taken to the lab, divided into 2 piece(s) and appropriately chromacoded and processed.       Tumor features identified on Mohs section: no tumor identified  Depth of defect: subcutaneous fat    Patient tolerance of procedure: tolerated well, no immediate complications    Reconstruction:  Was the defect reconstructed? Yes    Was reconstruction performed by the same Mohs surgeon? Yes    When was reconstruction performed? same day  Type of reconstruction: linear  Linear reconstruction: complex  Length of linear repair (cm): 3.7  Subcutaneous Layers (Deep Stitches)   Suture size:  5-0  Suture type:  Vicryl  Stitches:  Buried vertical mattress  Fine/surface layer approximation (top stitches)   Epidermal/Superficial suture size:  5-0  Epidermal/Superficial suture type:  Fast-absorbing gut  Stitches: simple running    Hemostasis achieved with: suture, pressure and electrodesiccation  Outcome: patient tolerated procedure well with no complications    Post-procedure details: sterile dressing applied and wound care instructions given     Dressing type: pressure dressing          Complex Linear Repair - Wide Undermining:  Given the location and size of the defect, it was determined that a complex layered linear closure was required to restore normal anatomy and function. The repair was considered complex because extensive undermining was required and performed. The amount of undermining performed was greater than the maximum width of the defect as measured perpendicular to the closure line along at least one entire edge of the defect. Standing cutaneous cones were removed using Burow's triangles. The wound edges were brought into close approximation with buried vertical mattress sutures. The remainder of the wound was then closed with epidermal top sutures.    The final repair measured 3.7 cm              Wound care was discussed, and the patient was given written post-operative wound care instructions.      The patient will follow up with Preston Cadena MD as needed for any post operative problems or concerns, and will follow up with their primary dermatologist as scheduled.

## 2024-05-25 DIAGNOSIS — E78.5 DYSLIPIDEMIA: ICD-10-CM

## 2024-05-25 DIAGNOSIS — Z00.00 ENCOUNTER FOR GENERAL ADULT MEDICAL EXAMINATION WITHOUT ABNORMAL FINDINGS: ICD-10-CM

## 2024-05-25 DIAGNOSIS — M17.10 UNILATERAL PRIMARY OSTEOARTHRITIS, UNSPECIFIED KNEE: ICD-10-CM

## 2024-05-28 RX ORDER — ROSUVASTATIN CALCIUM 20 MG/1
20 TABLET, COATED ORAL DAILY
Qty: 90 TABLET | Refills: 0 | Status: SHIPPED | OUTPATIENT
Start: 2024-05-28

## 2024-05-28 RX ORDER — DULOXETIN HYDROCHLORIDE 60 MG/1
60 CAPSULE, DELAYED RELEASE ORAL DAILY
Qty: 90 CAPSULE | Refills: 0 | Status: SHIPPED | OUTPATIENT
Start: 2024-05-28

## 2024-05-28 RX ORDER — FUROSEMIDE 40 MG/1
TABLET ORAL
Qty: 90 TABLET | Refills: 0 | Status: SHIPPED | OUTPATIENT
Start: 2024-05-28

## 2024-06-06 DIAGNOSIS — K21.9 GASTROESOPHAGEAL REFLUX DISEASE WITHOUT ESOPHAGITIS: ICD-10-CM

## 2024-06-06 RX ORDER — OMEPRAZOLE 20 MG/1
20 CAPSULE, DELAYED RELEASE ORAL DAILY
Qty: 90 CAPSULE | Refills: 0 | Status: SHIPPED | OUTPATIENT
Start: 2024-06-06

## 2024-06-24 DIAGNOSIS — N52.9 MALE ERECTILE DYSFUNCTION, UNSPECIFIED: ICD-10-CM

## 2024-06-24 RX ORDER — GABAPENTIN 300 MG/1
300 CAPSULE ORAL 2 TIMES DAILY
Qty: 180 CAPSULE | Refills: 0 | Status: SHIPPED | OUTPATIENT
Start: 2024-06-24

## 2024-06-27 DIAGNOSIS — T84.51XD INFECTION ASSOCIATED WITH INTERNAL RIGHT HIP PROSTHESIS, SUBSEQUENT ENCOUNTER: ICD-10-CM

## 2024-06-27 RX ORDER — AMOXICILLIN 875 MG/1
875 TABLET, FILM COATED ORAL
Qty: 180 TABLET | Refills: 1 | Status: SHIPPED | OUTPATIENT
Start: 2024-06-27

## 2024-07-24 ENCOUNTER — HOSPITAL ENCOUNTER (OUTPATIENT)
Dept: RADIOLOGY | Facility: CLINIC | Age: 69
Discharge: HOME | End: 2024-07-24
Payer: MEDICARE

## 2024-07-24 DIAGNOSIS — R91.1 LUNG NODULE: ICD-10-CM

## 2024-07-24 DIAGNOSIS — F17.210 CIGARETTE NICOTINE DEPENDENCE WITHOUT COMPLICATION: ICD-10-CM

## 2024-07-24 PROCEDURE — 71250 CT THORAX DX C-: CPT

## 2024-07-24 PROCEDURE — 71250 CT THORAX DX C-: CPT | Performed by: RADIOLOGY

## 2024-07-29 DIAGNOSIS — I35.8: Primary | ICD-10-CM

## 2024-07-30 ENCOUNTER — APPOINTMENT (OUTPATIENT)
Dept: DERMATOLOGY | Facility: CLINIC | Age: 69
End: 2024-07-30
Payer: MEDICARE

## 2024-07-30 DIAGNOSIS — Z41.9 ELECTIVE SURGERY FOR PURPOSES OTHER THAN TREATING HEALTH CONDITIONS: ICD-10-CM

## 2024-07-30 DIAGNOSIS — L71.1 RHINOPHYMA: Primary | ICD-10-CM

## 2024-07-30 ASSESSMENT — PATIENT GLOBAL ASSESSMENT (PGA): PATIENT GLOBAL ASSESSMENT: PATIENT GLOBAL ASSESSMENT:  3 - MODERATE

## 2024-07-30 ASSESSMENT — DERMATOLOGY PATIENT ASSESSMENT
HAVE YOU HAD OR DO YOU HAVE A STAPH INFECTION: YES
ARE YOU AN ORGAN TRANSPLANT RECIPIENT: NO
FOR PATIENTS COMING IN FOR A FOLLOW-UP VISIT - HAVE THERE BEEN ANY CHANGES IN YOUR HEALTH SINCE YOUR LAST VISIT: YES
DO YOU USE A TANNING BED: NO
DO YOU HAVE ANY NEW OR CHANGING LESIONS: NO
HAVE YOU HAD OR DO YOU HAVE VASCULAR DISEASE: NO
DO YOU USE SUNSCREEN: OCCASIONALLY

## 2024-07-30 ASSESSMENT — DERMATOLOGY QUALITY OF LIFE (QOL) ASSESSMENT
DID THE PATIENT HAVE A SEVERE MENTAL AND/OR PHYSICAL INCAPACITY THAT PREVENTED HIM OR HER FROM COMPLETING THE ASSESSMENT: NO
WHAT SINGLE SKIN CONDITION LISTED BELOW IS THE PATIENT ANSWERING THE QUALITY-OF-LIFE ASSESSMENT QUESTIONS ABOUT: ROSACEA
ARE THERE EXCLUSIONS OR EXCEPTIONS FOR THE QUALITY OF LIFE ASSESSMENT: NO
RATE HOW EMOTIONALLY BOTHERED YOU ARE BY YOUR SKIN PROBLEM (FOR EXAMPLE, WORRY, EMBARRASSMENT, FRUSTRATION): 6 - ALWAYS BOTHERED
RATE HOW BOTHERED YOU ARE BY EFFECTS OF YOUR SKIN PROBLEMS ON YOUR ACTIVITIES (EG, GOING OUT, ACCOMPLISHING WHAT YOU WANT, WORK ACTIVITIES OR YOUR RELATIONSHIPS WITH OTHERS): 0 - NEVER BOTHERED
DATE THE QUALITY-OF-LIFE ASSESSMENT WAS COMPLETED: 67051
RATE HOW BOTHERED YOU ARE BY SYMPTOMS OF YOUR SKIN PROBLEM (EG, ITCHING, STINGING BURNING, HURTING OR SKIN IRRITATION): 6 - ALWAYS BOTHERED

## 2024-07-30 ASSESSMENT — ITCH NUMERIC RATING SCALE: HOW SEVERE IS YOUR ITCHING?: 0

## 2024-07-30 NOTE — PROGRESS NOTES
Subjective     Sarthak Portillo is a 69 y.o. male who presents for the following: Cosmetic (Pt here for Cosmetic Consult for Rosacea located on the nose. Pt currently using Metronidazole Gel. Pt has c/o redness on nasal tip. No prior cosmetic treatment. ).   Patient states nose is bigger .    Review of Systems:  No other skin or systemic complaints other than what is documented elsewhere in the note.    The following portions of the chart were reviewed this encounter and updated as appropriate:          Skin Cancer History  Biopsy Date Type Location Status   4/26/24 BCC Right Parotid Area Refer Mohs/Surgeon  5/22/24       Specialty Problems          Dermatology Problems    Actinic keratosis    Erythema intertrigo    Hemangioma of skin and subcutaneous tissue    Melanocytic nevi of other parts of face    Melanocytic nevi of trunk    Melanocytic nevi of unspecified lower limb, including hip    Melanocytic nevi of unspecified upper limb, including shoulder    Neoplasm of uncertain behavior of skin    Other melanin hyperpigmentation    Other seborrheic keratosis    Rhinophyma    Rosacea, unspecified    Scar condition and fibrosis of skin    Skin changes due to chronic exposure to nonionizing radiation, unspecified        Objective   Well appearing patient in no apparent distress; mood and affect are within normal limits.    A focused skin examination was performed of the nose. All findings within normal limits unless otherwise noted below.    Assessment/Plan   1. Rhinophyma  Mid Tip of Nose  Thickened sebaceous glands on nose with erythema and telangiectasias    The nature of the diagnosis was reviewed with the patient today.  He was referred to Dr. Jacobson and not myself so was inappropriately scheduled for this visit today as I do not perform the procedure Dr. Sparks had referred to Dr. Jacobson for this so advised I would have them reschedule as I do laser for redness (as does Dr. Sparks) and Dr. Jacobson would be the  appropriate physician to see for evaluation of rhinophyma.        Patient also pointed out lesions of concern.  Left cheek, left forearm, left dorsal hand, left scalp - seborrheic keratosis. Advised benign.

## 2024-07-31 ENCOUNTER — OFFICE VISIT (OUTPATIENT)
Dept: DERMATOLOGY | Facility: CLINIC | Age: 69
End: 2024-07-31
Payer: MEDICARE

## 2024-07-31 DIAGNOSIS — L71.1 RHINOPHYMA: Primary | ICD-10-CM

## 2024-07-31 PROCEDURE — 4010F ACE/ARB THERAPY RXD/TAKEN: CPT | Performed by: DERMATOLOGY

## 2024-07-31 PROCEDURE — 99213 OFFICE O/P EST LOW 20 MIN: CPT | Performed by: DERMATOLOGY

## 2024-07-31 NOTE — PROGRESS NOTES
Subjective     Sarthak Portillo is a 69 y.o. male who presents for the following: Rosacea (Consultation for Rhinophyma on the Nose). Patient presenting for a consult for rhinophyma. 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 Pt wears a CPAP with a nasal cannula.    Review of Systems:  No other skin or systemic complaints other than what is documented elsewhere in the note.    The following portions of the chart were reviewed this encounter and updated as appropriate:          Skin Cancer History  Biopsy Date Type Location Status   4/26/24 BCC Right Parotid Area Refer Mohs/Surgeon  5/22/24       Specialty Problems          Dermatology Problems    Actinic keratosis    Erythema intertrigo    Hemangioma of skin and subcutaneous tissue    Melanocytic nevi of other parts of face    Melanocytic nevi of trunk    Melanocytic nevi of unspecified lower limb, including hip    Melanocytic nevi of unspecified upper limb, including shoulder    Neoplasm of uncertain behavior of skin    Other melanin hyperpigmentation    Other seborrheic keratosis    Rhinophyma    Rosacea, unspecified    Scar condition and fibrosis of skin    Skin changes due to chronic exposure to nonionizing radiation, unspecified        Objective   Well appearing patient in no apparent distress; mood and affect are within normal limits.    A focused skin examination was performed of the head, eyes, nose, lips. All findings within normal limits unless otherwise noted below.    Rhinophymatous change to the nasal bulb and bilateral alae with sebaceous hyperplasia and telangiectasia.    Sebaceous hyperplasia on the glabella.                          Assessment/Plan     Discussed hot loop procedure for rhinophyma with the patient, including risks (such as return of  rhinophyma following procedure, poor wound epithelialization, tenderness, and risk of bleeding/infection) and benefits of the procedure. Reviewed use of lidocaine for anesthetic. Informed patient about aftercare, including dressings with vaseline over the nose for 3 weeks following the procedure, gentle cleansing with soap and water, no swimming during wound resurfacing, and minimal sun exposure. Pt told to bring a picture of his nose from 10 years ago on the day of the appointment.    Patient scheduled for hot loop.    Lora Cardenas MD  Department of Dermatology    I saw and evaluated the patient, reviewed the resident's notes and discussed the patient's managment.  I agree with the documented findings and plan of care.   Eulogio Jacobson MD, PhD

## 2024-08-08 ENCOUNTER — PROCEDURE VISIT (OUTPATIENT)
Dept: DERMATOLOGY | Facility: CLINIC | Age: 69
End: 2024-08-08
Payer: MEDICARE

## 2024-08-08 ENCOUNTER — APPOINTMENT (OUTPATIENT)
Dept: DERMATOLOGY | Facility: CLINIC | Age: 69
End: 2024-08-08
Payer: MEDICARE

## 2024-08-08 VITALS — DIASTOLIC BLOOD PRESSURE: 79 MMHG | SYSTOLIC BLOOD PRESSURE: 131 MMHG | HEART RATE: 80 BPM

## 2024-08-08 DIAGNOSIS — I10 ESSENTIAL (PRIMARY) HYPERTENSION: ICD-10-CM

## 2024-08-08 DIAGNOSIS — L71.1 RHINOPHYMA: Primary | ICD-10-CM

## 2024-08-08 PROCEDURE — 30120 REVISION OF NOSE: CPT | Performed by: DERMATOLOGY

## 2024-08-08 RX ORDER — LISINOPRIL 40 MG/1
TABLET ORAL
Qty: 90 TABLET | Refills: 0 | Status: SHIPPED | OUTPATIENT
Start: 2024-08-08

## 2024-08-08 NOTE — PROGRESS NOTES
Electrodesiccation and Curettage Operative Note    Date of Surgery:  8/8/2024  Surgeon:  Eulogio Jacobson MD PhD  Office Location: [address]   [address] [address]  Dept: [phone]  Dept Fax: [phone]      Assessment/Plan     We recommended that the patient have regular full skin exams given an increased risk of subsequent skin cancers. The patient was instructed to use sun protective behaviors including use of broad spectrum sunscreens and sun protective clothing to reduce risk of skin cancers.     The treatment options including ED&C (given the superficial nature of the skin cancer) were discussed. Risks and benefits of the procedure were reviewed. After discussion, the patient elected to proceed with ED&C.     Pre-procedure:   Obtained informed consent: written from patient    Intra-operative:   Audible time out called at : 2:08 PM 08/08/24  by: ZHOU REGAN RN   Verified patient name, birthdate, site, specimen bottle label & requisition.    The planned procedure(s) was again reviewed with the patient. The risks of bleeding, infection, nerve damage and scarring were reviewed. The patient identify, surgical site, and planned procedure(s) were verified.     Rhinophyma  Nose    Informed consent: discussed and consent obtained    Timeout: patient name, date of birth, surgical site, and procedure verified    Procedure prep:  Patient was prepped and draped  Anesthesia: the lesion was anesthetized in a standard fashion    Anesthetic:  1% lidocaine w/ epinephrine 1-100,000 local infiltration  Hemostasis achieved with: electrodesiccation    Outcome: patient tolerated procedure well with no complications    Post-procedure details: sterile dressing applied and wound care instructions given    Dressing type: pressure dressing    Additional details:  The lesion was treated via destruction using the electrodesiccation and curettage technique.                                                Wound care was discussed, and the patient was given written post-operative wound care instructions.      The patient will follow up with Eulogio Jacobson MD PhD as needed for any post operative problems or concerns, and will follow up with their primary dermatologist as scheduled.

## 2024-08-13 ENCOUNTER — HOSPITAL ENCOUNTER (OUTPATIENT)
Dept: CARDIOLOGY | Facility: CLINIC | Age: 69
Discharge: HOME | End: 2024-08-13
Payer: MEDICARE

## 2024-08-13 DIAGNOSIS — I35.8: ICD-10-CM

## 2024-08-13 PROCEDURE — 93306 TTE W/DOPPLER COMPLETE: CPT

## 2024-08-13 PROCEDURE — 93306 TTE W/DOPPLER COMPLETE: CPT | Performed by: INTERNAL MEDICINE

## 2024-08-14 LAB
AORTIC VALVE MEAN GRADIENT: 26.4 MMHG
AORTIC VALVE PEAK VELOCITY: 3.59 M/S
AV PEAK GRADIENT: 51.6 MMHG
AVA (PEAK VEL): 1.4 CM2
AVA (VTI): 1.85 CM2
EJECTION FRACTION APICAL 4 CHAMBER: 76.2
EJECTION FRACTION: 75 %
LEFT VENTRICLE INTERNAL DIMENSION DIASTOLE: 4.82 CM (ref 3.5–6)
LEFT VENTRICULAR OUTFLOW TRACT DIAMETER: 2 CM
MITRAL VALVE E/A RATIO: 0.66
RIGHT VENTRICLE FREE WALL PEAK S': 19 CM/S
TRICUSPID ANNULAR PLANE SYSTOLIC EXCURSION: 2 CM

## 2024-08-15 ENCOUNTER — APPOINTMENT (OUTPATIENT)
Dept: DERMATOLOGY | Facility: CLINIC | Age: 69
End: 2024-08-15
Payer: MEDICARE

## 2024-08-15 DIAGNOSIS — L71.1 RHINOPHYMA: ICD-10-CM

## 2024-08-15 DIAGNOSIS — Z51.89 VISIT FOR WOUND CHECK: ICD-10-CM

## 2024-08-15 PROCEDURE — 99024 POSTOP FOLLOW-UP VISIT: CPT | Performed by: DERMATOLOGY

## 2024-08-15 PROCEDURE — 4010F ACE/ARB THERAPY RXD/TAKEN: CPT | Performed by: DERMATOLOGY

## 2024-08-15 NOTE — PROGRESS NOTES
Office Follow Up Note    Visit Summary  Chief Complaint    1. Complaint Wound check.    Sarthak Portillo is a 69 y.o. male who presents for 5 day follow up after surgery for Rhinophyma. The patient has no concerns today.     Location Operation site location: nose    On exam,  Mr. Portillo is well-appearing and in no apparent distress. The surgical site appears clean with minimal to no erythema. No tenderness and good wound edge apposition.   + Normal granulation tissue              Assessment and Plan:  The patient was reassured that the wound is healing appropriately.   The dressing was removed, the wound cleaned a a new dressing reapplied.     The patient was advised on the importance of routine skin monitoring including follow up with general dermatology and instructed to call with any further concerns.

## 2024-08-16 ENCOUNTER — TELEPHONE (OUTPATIENT)
Dept: PRIMARY CARE | Facility: CLINIC | Age: 69
End: 2024-08-16
Payer: MEDICARE

## 2024-08-16 NOTE — TELEPHONE ENCOUNTER
----- Message from Cory KELLER sent at 8/15/2024  2:11 PM EDT -----    ----- Message -----  From: Ayo Zuniga MD  Sent: 8/15/2024   1:40 PM EDT  To: Willow Chandler RN; Cory Roche MA    Heart ultrasound shows mild valve issue will need to repeat ultrasound in 1 year

## 2024-08-25 DIAGNOSIS — E78.5 DYSLIPIDEMIA: ICD-10-CM

## 2024-08-25 DIAGNOSIS — Z00.00 ENCOUNTER FOR GENERAL ADULT MEDICAL EXAMINATION WITHOUT ABNORMAL FINDINGS: ICD-10-CM

## 2024-08-25 DIAGNOSIS — M17.10 UNILATERAL PRIMARY OSTEOARTHRITIS, UNSPECIFIED KNEE: ICD-10-CM

## 2024-08-26 RX ORDER — ROSUVASTATIN CALCIUM 20 MG/1
20 TABLET, COATED ORAL DAILY
Qty: 90 TABLET | Refills: 0 | Status: SHIPPED | OUTPATIENT
Start: 2024-08-26

## 2024-08-26 RX ORDER — DULOXETIN HYDROCHLORIDE 60 MG/1
60 CAPSULE, DELAYED RELEASE ORAL DAILY
Qty: 90 CAPSULE | Refills: 0 | Status: SHIPPED | OUTPATIENT
Start: 2024-08-26

## 2024-08-26 RX ORDER — FUROSEMIDE 40 MG/1
TABLET ORAL
Qty: 90 TABLET | Refills: 0 | Status: SHIPPED | OUTPATIENT
Start: 2024-08-26

## 2024-09-01 DIAGNOSIS — K21.9 GASTROESOPHAGEAL REFLUX DISEASE WITHOUT ESOPHAGITIS: ICD-10-CM

## 2024-09-04 RX ORDER — OMEPRAZOLE 20 MG/1
20 CAPSULE, DELAYED RELEASE ORAL DAILY
Qty: 90 CAPSULE | Refills: 0 | Status: SHIPPED | OUTPATIENT
Start: 2024-09-04

## 2024-09-11 ENCOUNTER — APPOINTMENT (OUTPATIENT)
Dept: DERMATOLOGY | Facility: CLINIC | Age: 69
End: 2024-09-11
Payer: MEDICARE

## 2024-09-11 DIAGNOSIS — D48.9 NEOPLASM OF UNCERTAIN BEHAVIOR: Primary | ICD-10-CM

## 2024-09-11 DIAGNOSIS — L71.9 ROSACEA: ICD-10-CM

## 2024-09-11 DIAGNOSIS — D22.9 MULTIPLE BENIGN MELANOCYTIC NEVI: ICD-10-CM

## 2024-09-11 DIAGNOSIS — L29.9 PRURITUS: ICD-10-CM

## 2024-09-11 DIAGNOSIS — L82.1 SEBORRHEIC KERATOSES: ICD-10-CM

## 2024-09-11 DIAGNOSIS — L90.5 SCAR CONDITIONS AND FIBROSIS OF SKIN: ICD-10-CM

## 2024-09-11 DIAGNOSIS — L57.8 SUN-DAMAGED SKIN: ICD-10-CM

## 2024-09-11 DIAGNOSIS — D18.01 CHERRY ANGIOMA: ICD-10-CM

## 2024-09-11 DIAGNOSIS — Z12.83 SCREENING EXAM FOR SKIN CANCER: ICD-10-CM

## 2024-09-11 PROBLEM — C44.90 NON-MELANOMA SKIN CANCER: Status: ACTIVE | Noted: 2024-09-11

## 2024-09-11 PROCEDURE — 99213 OFFICE O/P EST LOW 20 MIN: CPT | Performed by: DERMATOLOGY

## 2024-09-11 PROCEDURE — 1159F MED LIST DOCD IN RCRD: CPT | Performed by: DERMATOLOGY

## 2024-09-11 PROCEDURE — 4010F ACE/ARB THERAPY RXD/TAKEN: CPT | Performed by: DERMATOLOGY

## 2024-09-11 ASSESSMENT — DERMATOLOGY PATIENT ASSESSMENT
ARE YOU AN ORGAN TRANSPLANT RECIPIENT: NO
DO YOU USE A TANNING BED: NO
WHERE ARE THESE NEW OR CHANGING LESIONS LOCATED: GENERALIZED
HAVE YOU HAD OR DO YOU HAVE A STAPH INFECTION: NO
DO YOU USE SUNSCREEN: DAILY
HAVE YOU HAD OR DO YOU HAVE VASCULAR DISEASE: NO
DO YOU HAVE ANY NEW OR CHANGING LESIONS: YES

## 2024-09-11 ASSESSMENT — DERMATOLOGY QUALITY OF LIFE (QOL) ASSESSMENT
RATE HOW EMOTIONALLY BOTHERED YOU ARE BY YOUR SKIN PROBLEM (FOR EXAMPLE, WORRY, EMBARRASSMENT, FRUSTRATION): 3
RATE HOW BOTHERED YOU ARE BY EFFECTS OF YOUR SKIN PROBLEMS ON YOUR ACTIVITIES (EG, GOING OUT, ACCOMPLISHING WHAT YOU WANT, WORK ACTIVITIES OR YOUR RELATIONSHIPS WITH OTHERS): 1
DATE THE QUALITY-OF-LIFE ASSESSMENT WAS COMPLETED: 67094
RATE HOW BOTHERED YOU ARE BY SYMPTOMS OF YOUR SKIN PROBLEM (EG, ITCHING, STINGING BURNING, HURTING OR SKIN IRRITATION): 3

## 2024-09-11 ASSESSMENT — PATIENT GLOBAL ASSESSMENT (PGA): PATIENT GLOBAL ASSESSMENT: PATIENT GLOBAL ASSESSMENT:  3 - MODERATE

## 2024-09-11 ASSESSMENT — ITCH NUMERIC RATING SCALE: HOW SEVERE IS YOUR ITCHING?: 4

## 2024-09-11 NOTE — PROGRESS NOTES
Subjective     Sarthak Portillo is a 69 y.o. male who presents for the following: Skin Check (New spots he reports on his body. Scalp and bilateral arms itching.). He underwent hot loop removal of his rhinophyma with Dr. Jacobson on 8/8/24. At last visit there was a neoplasm of the left nasal vestibule, advised follow up with ENT, he reports he has not done so yet.    Skin Cancer History  Biopsy Date Type Location Status   4/26/24 BCC Right Parotid Area Treatment Complete  9/11/24     Review of Systems:  No other skin or systemic complaints other than what is documented elsewhere in the note.    The following portions of the chart were reviewed this encounter and updated as appropriate:       Specialty Problems          Dermatology Problems    Actinic keratosis    Erythema intertrigo    Hemangioma of skin and subcutaneous tissue    Melanocytic nevi of other parts of face    Melanocytic nevi of trunk    Melanocytic nevi of unspecified lower limb, including hip    Melanocytic nevi of unspecified upper limb, including shoulder    Neoplasm of uncertain behavior of skin    Other melanin hyperpigmentation    Other seborrheic keratosis    Rhinophyma    Rosacea, unspecified    Scar condition and fibrosis of skin    Skin changes due to chronic exposure to nonionizing radiation, unspecified    Non-melanoma skin cancer     Lesion 1: Superficial Basal Cell Carcinoma. Deep margins involved. Year Diagnosed: 2023. September. Location: Right Medial Mid Calf Treatment(s): curretage with Dr. David/Bridger Pathology: [id number]    Lesion 2: right parotid BCC s/p mohs 2024          Past Medical History:  Sarthak Portillo  has a past medical history of Chronic obstructive pulmonary disease, unspecified (Multi) (11/21/2022) and Sleep apnea, unspecified (01/29/2021).    Past Surgical History:  Sarthak Portillo  has a past surgical history that includes Tonsillectomy (04/04/2013); Other surgical history (04/04/2013); Appendectomy (04/04/2013);  Other surgical history (04/04/2013); Umbilical hernia repair (04/04/2013); Other surgical history (03/14/2020); Other surgical history (03/14/2020); Knee surgery (03/14/2020); Other surgical history (03/14/2020); US guided abscess fluid collection drainage (8/19/2020); and CT angio head w and wo IV contrast (11/28/2018).    Family History:  Patient family history includes Pancreatic cancer in his mother.    Social History:  Sarthak Portillo  reports that he has been smoking cigarettes. He has never used smokeless tobacco. No history on file for alcohol use and drug use.    Allergies:  Adhesive tape-silicones, Iodine, and Tramadol    Current Medications / CAM's:    Current Outpatient Medications:     albuterol (Ventolin HFA) 90 mcg/actuation inhaler, Inhale 2 puffs. 4-6 hrs prn, Disp: , Rfl:     albuterol 2.5 mg /3 mL (0.083 %) nebulizer solution, USE 1 UNIT DOSE EVERY 4-6 HOURS AS NEEDED FOR WHEEZING ., Disp: , Rfl:     alprostadil (Muse) 500 mcg pellet, Insert 1 suppository as needed, no more than 1 per day, Disp: , Rfl:     amoxicillin (Amoxil) 875 mg tablet, TAKE 1 TABLET (875 MG) BY MOUTH 2 TIMES A DAY WITH MEALS., Disp: 180 tablet, Rfl: 1    BinaxNOW COVID-19 Ag Self Test kit, USE AS DIRECTED, Disp: , Rfl:     budesonide-formoteroL (Symbicort) 160-4.5 mcg/actuation inhaler, Inhale 2 puffs 2 times a day. RINSE MOUTH AFTER USE, Disp: 10.2 each, Rfl: 3    cetirizine (ZyrTEC) 5 mg tablet, Take 1 tablet (5 mg) by mouth once daily in the evening., Disp: , Rfl:     doxycycline (Periostat) 20 mg tablet, Take 1 tablet (20 mg) by mouth. WITH FOOD AND WATER, Disp: , Rfl:     DULoxetine (Cymbalta) 60 mg DR capsule, TAKE 1 CAPSULE BY MOUTH EVERY DAY, Disp: 90 capsule, Rfl: 0    furosemide (Lasix) 40 mg tablet, TAKE 1 TABLET BY MOUTH EVERY DAY, Disp: 90 tablet, Rfl: 0    gabapentin (Neurontin) 300 mg capsule, TAKE 1 CAPSULE BY MOUTH TWICE A DAY, Disp: 180 capsule, Rfl: 0    Jardiance 10 mg, TAKE 1 TABLET BY MOUTH EVERY DAY,  Disp: 90 tablet, Rfl: 1    lisinopril 40 mg tablet, TAKE 1 TABLET BY MOUTH EVERY DAY, Disp: 90 tablet, Rfl: 0    metroNIDAZOLE (Metrogel) 0.75 % gel, Apply topically 2 times a day., Disp: , Rfl:     omeprazole (PriLOSEC) 20 mg DR capsule, TAKE 1 CAPSULE BY MOUTH EVERY DAY, Disp: 90 capsule, Rfl: 0    oxybutynin XL (Ditropan-XL) 10 mg 24 hr tablet, Take 1 tablet (10 mg) by mouth once daily., Disp: , Rfl:     polyethylene glycol (Glycolax, Miralax) 17 gram/dose powder, Take as directed, Disp: , Rfl:     polyethylene glycol-electrolytes 420 gram solution, TAKE AS DIRECTED., Disp: , Rfl:     predniSONE (Deltasone) 20 mg tablet, Take 1 tablet (20 mg) by mouth 2 times a day., Disp: , Rfl:     rosuvastatin (Crestor) 20 mg tablet, TAKE 1 TABLET BY MOUTH EVERY DAY, Disp: 90 tablet, Rfl: 0    sildenafil (Viagra) 100 mg tablet, Take 1 tablet (100 mg) by mouth. 1 hour prior to sexual intercourse, Disp: , Rfl:     tamsulosin (Flomax) 0.4 mg 24 hr capsule, Take 1 capsule (0.4 mg) by mouth once daily., Disp: 30 capsule, Rfl: 11    testosterone 20.25 mg/1.25 gram (1.62 %) gel in metered-dose pump, 1 Pump once daily. Each shoulder, Disp: , Rfl:     trospium (Sanctura XR) 60 mg 24 hour capsule, Take 1 capsule (60 mg) by mouth once daily., Disp: , Rfl:      Objective   Well appearing patient in no apparent distress; mood and affect are within normal limits.    A full examination was performed including scalp, head, eyes, ears, nose, lips, neck, chest, axillae, abdomen, back, buttocks, bilateral upper extremities, bilateral lower extremities, hands, feet, fingers, toes, fingernails, and toenails. Patient declined genital and gluteal cleft exam. All findings within normal limits unless otherwise noted below.    Pruritus in areas of actinic damage on scalp, shoulders    Xerotic skin on lower legs    No rash    - scattered regular brown macules and papules    - Scattered waxy tan/grey/brown papules with horn cysts    - scattered small  bright red papules and macules    - Well healed scar at prior treatment sites without visual or palpable evidence of recurrence.     - scattered tan macules, telangiectasias, and general photo-damage    Left Nasal Vestibule  Left intranasal with difficult to fully visualize verrucous papule, about 4mm     Head - Anterior (Face), Nose  Rhinophymatous nose and scattered telangiectasias, healing following hot loop procedure         Assessment/Plan   Neoplasm of uncertain behavior  Left Nasal Vestibule    Given the location and difficulty visualizing, I asked him to get in for a fuv with his ENT (has one for prior surgery on right neck) at the last visit. He endorses that he will do this for eval/removal with ENT.     Pruritus    -Encouraged Sarna sensitive skin lotion 2-4x daily for the shoulders  -Advise a thick moisturizer for dry skin on lower legs, samples provided today; gave him aveeno gel with colloidal oatmeal since he is less apt to use creams to ointments  -For areas of chaffing in axillae and antecubital fossae, allow to fully dry after bathing then purchas glide stick for men and use generously in these areas daily. No signs of intertrigo today.     Rosacea  Head - Anterior (Face); Nose    Rosacea with papulopustular component and notable rhinophymatous and telangiectatic component  -Recently completed hot loop procedure for rhinophyma with Dr. Jacobson 8/8/24  - Continue metrogel bid and generous vaseline use daily while nose is healing following procedure  - Continue strict UV protection daily  - Encouraged smoking cessation as worsening the skin changes noted above  - Notes that doxycycline PO was not tolerated due to loose stools  - Has follow up with Dr. Jacobson 9/24/24    Multiple benign melanocytic nevi    Benign melanocytic nevi  - Discussed benign nature and that no treatment is necessary unless it becomes painful or increases in size. Patient opts for clinical monitoring at this time.    - Sun  protective behavior reviewed and encouraged including the use of over-the-counter sunscreen with SPF30+ daily (reapply every 1.5 hours when outdoors), UPF clothing, broad rimmed hats, sunglasses, and avoidance of midday sun. Home skin monitoring encouraged and how to monitor for skin cancer (changing or new moles, new rapidly growing or non-healing lesions) reviewed. Patient encouraged to call with interval concerns or changes.      Seborrheic keratoses    Seborrheic keratosis (-es)  - Discussed benign nature and that no treatment is necessary unless it becomes painful or increases in size. Patient opts for clinical monitoring at this time.      Cherry angioma    Cherry angioma(s)  - Discussed benign nature and that no treatment is necessary unless it becomes painful or increases in size. Patient opts for clinical monitoring at this time.      Scar conditions and fibrosis of skin    - Well healed scar(s) at sites of prior skin cancer- h/o basal cell carcinoma right lower leg 2023 s/p curettage; right cheek basal cell carcinoma 2024 s/p mohs  - Sun protective behavior reviewed and encouraged including the use of over-the-counter sunscreen with SPF30+ daily (reapply every 1.5 hours when outdoors), UPF clothing, broad rimmed hats, sunglasses, and avoidance of midday sun. Home skin monitoring encouraged and how to monitor for skin cancer (changing or new moles, new rapidly growing or non-healing lesions) reviewed. Patient encouraged to call with interval concerns or changes.       Sun-damaged skin    Actinically damaged skin-  - Sun protective behavior reviewed and encouraged including the use of over-the-counter sunscreen with SPF30+ daily (reapply every 1.5 hours when outdoors), UPF clothing, broad rimmed hats, sunglasses, and avoidance of midday sun. Home skin monitoring encouraged and how to monitor for skin cancer (changing or new moles, new rapidly growing or non-healing lesions) reviewed. Patient encouraged to  call with interval concerns or changes.      Screening exam for skin cancer    Related Procedures  Follow Up In Dermatology - Established Patient       Return to clinic in 6-9 months for full body skin check.    Lora Cardenas MD  Department of Dermatology    I saw and evaluated the patient. I personally obtained the key and critical portions of the history and physical exam or was physically present for key and critical portions performed by the resident/fellow. I reviewed the resident/fellow's documentation and discussed the patient with the resident/fellow. I agree with the resident/fellow's medical decision making as documented in the note.    Meredith Sparks MD

## 2024-09-17 ENCOUNTER — OFFICE VISIT (OUTPATIENT)
Dept: OTOLARYNGOLOGY | Facility: CLINIC | Age: 69
End: 2024-09-17
Payer: MEDICARE

## 2024-09-17 VITALS — WEIGHT: 245 LBS | BODY MASS INDEX: 33.23 KG/M2

## 2024-09-17 DIAGNOSIS — R43.8 HYPOSMIA: ICD-10-CM

## 2024-09-17 DIAGNOSIS — J31.0 CHRONIC RHINITIS: ICD-10-CM

## 2024-09-17 DIAGNOSIS — R04.0 EPISTAXIS, RECURRENT: ICD-10-CM

## 2024-09-17 PROCEDURE — 4004F PT TOBACCO SCREEN RCVD TLK: CPT | Performed by: GENERAL PRACTICE

## 2024-09-17 PROCEDURE — 4010F ACE/ARB THERAPY RXD/TAKEN: CPT | Performed by: GENERAL PRACTICE

## 2024-09-17 PROCEDURE — 99204 OFFICE O/P NEW MOD 45 MIN: CPT | Performed by: GENERAL PRACTICE

## 2024-09-17 RX ORDER — MUPIROCIN 20 MG/G
OINTMENT TOPICAL 2 TIMES DAILY
Qty: 22 G | Refills: 2 | Status: SHIPPED | OUTPATIENT
Start: 2024-09-17 | End: 2024-10-01

## 2024-09-17 RX ORDER — TRIAMCINOLONE ACETONIDE 55 UG/1
2 SPRAY, METERED NASAL DAILY
Qty: 16.5 G | Refills: 3 | Status: SHIPPED | OUTPATIENT
Start: 2024-09-17 | End: 2025-09-17

## 2024-09-17 NOTE — PROGRESS NOTES
Otolaryngology - Head and Neck Surgery Outpatient New Patient Visit Note        Assessment/Plan:   Problem List Items Addressed This Visit    None  Visit Diagnoses         Codes    Epistaxis, recurrent     R04.0    Relevant Medications    mupirocin (Bactroban) 2 % ointment    Chronic rhinitis     J31.0    Relevant Medications    triamcinolone (Nasacort) 55 mcg nasal inhaler    Hyposmia     R43.8            69yoM with recurrent epistaxis L>R associated with flonase use.    L>R raw septal mucosa.   Will have pt use mupirocin BID for 2 weeks then saline gel.  Discussed conservative management for epistaxis.     Pt reports some hyposmia since COVID infection 1-2y ago.  Recommended olfactory training with Airphrame.     Recommended use of nasacort for rhinitis rather than flonase, starting in 2 weeks.       NOSE CARE and NOSEBLEED PREVENTION  - Do not stick anything in your nose other than the medicine as noted below. DO NOT pick your nose as this will cause the bleeding  - Avoid any nose blowing, sneeze with your mouth open, avoid any maneuvers that increase the blood pressure in your head (such as straining on the toilet) and keep your head above your heart as much as possible until otherwise directed.  - Begin Mupirocin ointment 3 times daily FOR 2-4 WEEKS as directed. Use the pads of your fingers to apply the ointment and then sniff back gently. Do not use a cue tip or finger nail to place the ointment as this can cause further trauma. IF THE PRESCRIBED OINTMENT IS TOO EXPENSIVE THEN JUST USE OVER THE COUNTER BACITRACIN OR TRIPLE ANTIBIOTIC OINTMENT.  - We recommended the patient use a humidifier in the bedroom and in the house.  - After 2 weeks start using nasal saline gel (Ayr nasal gel) at least 3 times per day. Alternatively, you can also use Vaseline three times daily. You can also use a saline nasal spray (Ocean nasal spray) 4-6 times daily. These are all over the counter medications  - We discussed nosebleed  prevention and acute treatment - Afrin or oxymetazoline spray, 2 sprays in each nostril for bleeding, apply pressure for 10 minutes across the soft part of the nose (pinch your nostrils together). If bleeding occurs reapply for another 10 minutes. If this doesn't work then call our office or go to the Emergency Department.         Follow up:  -plan for follow up in clinic as needed    All of the above findings, impressions, treatment planning and follow up plans were discussed with the patient who indicated understanding.  the patient was instructed to contact or return to clinic sooner if symptoms/signs persist or worsen despite the above management.      Ad Chavez MD  Otolaryngology - Head and Neck Surgery            History Of Present Illness  Sarthak Portillo is a 69 y.o. male presenting for concerns for epistaxis.    Reports recent use of flonase for congestion and nonpurulent rhinorrhea, but developed intermittent L>R epistaxis.   Reports packing nose to stop bleeding, but epistaxis has been recurrent.  Relatively minor flow.     No other easy bleeding/bruising.   Notes some allergic rhinitis, no antihistamines currently.  Rare sinusitis    Prior septoturbinoplasty                 Past Medical History  He has a past medical history of Chronic obstructive pulmonary disease, unspecified (Multi) (11/21/2022) and Sleep apnea, unspecified (01/29/2021).    Surgical History  He has a past surgical history that includes Tonsillectomy (04/04/2013); Other surgical history (04/04/2013); Appendectomy (04/04/2013); Other surgical history (04/04/2013); Umbilical hernia repair (04/04/2013); Other surgical history (03/14/2020); Other surgical history (03/14/2020); Knee surgery (03/14/2020); Other surgical history (03/14/2020); US guided abscess fluid collection drainage (8/19/2020); and CT angio head w and wo IV contrast (11/28/2018).     Social History  He reports that he has been smoking cigarettes. He has never used  smokeless tobacco. No history on file for alcohol use and drug use.    Family History  Family History   Problem Relation Name Age of Onset    Pancreatic cancer Mother          Allergies  Adhesive tape-silicones, Iodine, and Tramadol    Review of Systems  ROS: Pertinent positives as noted in HPI.    - CONSTITUTIONAL: Does not report weight loss, fever or chills.    - HEENT:   Ear: Does not report tinnitus, vertigo, hearing loss, otalgia, otorrhea  Nose: Does not report  ,  ,  , decreased smell  Throat: Does not report pain, dysphagia, odynophagia  Larynx: Does not report hoarseness,  difficulty breathing, pain with speaking (odynophonia)  Neck: Does not report new masses, pain, swelling  Face: Does not report sinus pain, pressure, swelling, numbness, weakness     - RESPIRATORY: Does not report SOB or cough.    - CV: Does not report palpitations or chest pain.     - GI: Does not report abdominal pain, nausea, vomiting or diarrhea.    - : Does not report dysuria or urinary frequency.    - MSK: Does not report myalgia or joint pain.    - SKIN: Does not report rash or pruritus.    - NEUROLOGICAL: Does not report headache or syncope.    - PSYCHIATRIC: Does not report recent changes in mood. Does not report anxiety or depression.         Physical Exam:     GENERAL:   Alert & Oriented to person, place and time; Normal affect and appearance. Well developed and well nourished. Conversant & cooperative with examination.     HEAD:   Normocephalic, atraumatic. No sinus tenderness to palpation. Normal parotid bilaterally. Normal facial strength.     NEUROLOGIC:   Cranial nerves II-XII grossly intact, gait WNL. Normal mood and affect.    EYES:   Extraocular movements intact. Pupils equal, round, reactive to light and accommodation. No nystagmus, no ptosis. no scleral injection.    EAR:   Normal auricle. No discomfort or TTP with manipulation.   Handheld otoscopic exam showed normal external auditory canals bilaterally. No  purulence or EAC inflammation. Minimal cerumen.   Right tympanic membrane clear and mobile without evidence of perforation, retraction or middle ear effusion.   Left tympanic membrane clear and mobile without evidence of perforation, retraction or middle ear effusion.     NOSE:   No external deformity. No external nasal lesions, lacerations, or scars. Nasal tip symmetrical with normal nasal valves.   Nasal cavity with essentially midline septum, raw/dry septal mucosal L>R with some mucoid debris and small clot.  Otherwise edematous  mucosa and turbinates. No lesions, masses, purulence or polyps.     OC/OP:   Mucous membranes moist, no masses, lesions or exudates.   Normal tongue, floor of mouth, teeth, gums, lips. Normal posterior pharyngeal wall.    Normal tonsils without erythema, exudate or obvious calculi     NECK:   No neck masses or thyroid enlargement. Trachea midline. No tenderness to palpation    LYMPHATIC:   No cervical lymphadenopathy.     RESPIRATORY:   Symmetric chest elevation & no retractions. No significant hoarseness. No increased work of breathing.    CV:   No clubbing or cyanosis. No obvious edema    Skin:   No facial rashes, vesicles or lesions.     Extremities:   No gross abnormalities      Clinic Procedure        Information review:  External sources (notes, imaging, lab results) listed below personally reviewed to aid in medical decision making process.  -  -  -

## 2024-09-18 DIAGNOSIS — J44.9 CHRONIC OBSTRUCTIVE PULMONARY DISEASE, UNSPECIFIED COPD TYPE (MULTI): ICD-10-CM

## 2024-09-18 RX ORDER — BUDESONIDE AND FORMOTEROL FUMARATE DIHYDRATE 160; 4.5 UG/1; UG/1
2 AEROSOL RESPIRATORY (INHALATION) 2 TIMES DAILY
Qty: 3 G | Refills: 1 | Status: SHIPPED | OUTPATIENT
Start: 2024-09-18

## 2024-09-24 ENCOUNTER — APPOINTMENT (OUTPATIENT)
Dept: DERMATOLOGY | Facility: CLINIC | Age: 69
End: 2024-09-24
Payer: MEDICARE

## 2024-09-24 DIAGNOSIS — Z51.89 VISIT FOR WOUND CHECK: ICD-10-CM

## 2024-09-24 PROCEDURE — 4010F ACE/ARB THERAPY RXD/TAKEN: CPT | Performed by: DERMATOLOGY

## 2024-09-24 PROCEDURE — 99024 POSTOP FOLLOW-UP VISIT: CPT | Performed by: DERMATOLOGY

## 2024-09-24 NOTE — PROGRESS NOTES
Office Follow Up Note    Visit Summary  Chief Complaint    1. Complaint Wound check.    Sarthak Portillo is a 69 y.o. 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 The patient has no concerns today.     Location Operation site location: nose    On exam,  Mr. Portillo is well-appearing and in no apparent distress. The procedural site appears clean with minimal to no erythema. No tenderness.     Assessment/Plan   1. Visit for wound check  Nose                          The patient was reassured that the wound is healing appropriately. He was informed that the bumps should flatten/smooth out with time, and we may consider thinning out the nasolabial fold areas if needed later.    The patient was advised on the importance of routine skin monitoring including follow up with general dermatology and instructed to call with any further concerns. The patient will return in 2 months for next wound check.        Baltazar Triana MD  Department of Dermatology    I saw and evaluated the patient, reviewed the resident's notes and discussed the patient's managment.  I agree with the documented findings and plan of care.   Eulogio Jacobson MD, PhD

## 2024-09-25 DIAGNOSIS — N52.9 MALE ERECTILE DYSFUNCTION, UNSPECIFIED: ICD-10-CM

## 2024-09-25 RX ORDER — GABAPENTIN 300 MG/1
300 CAPSULE ORAL 2 TIMES DAILY
Qty: 180 CAPSULE | Refills: 0 | OUTPATIENT
Start: 2024-09-25

## 2024-09-27 ENCOUNTER — APPOINTMENT (OUTPATIENT)
Dept: PRIMARY CARE | Facility: CLINIC | Age: 69
End: 2024-09-27
Payer: MEDICARE

## 2024-10-06 DIAGNOSIS — E11.69 TYPE 2 DIABETES MELLITUS WITH OTHER SPECIFIED COMPLICATION, UNSPECIFIED WHETHER LONG TERM INSULIN USE (MULTI): ICD-10-CM

## 2024-10-07 ENCOUNTER — APPOINTMENT (OUTPATIENT)
Dept: PRIMARY CARE | Facility: CLINIC | Age: 69
End: 2024-10-07
Payer: MEDICARE

## 2024-10-07 VITALS
WEIGHT: 250 LBS | HEIGHT: 72 IN | DIASTOLIC BLOOD PRESSURE: 72 MMHG | BODY MASS INDEX: 33.86 KG/M2 | HEART RATE: 84 BPM | SYSTOLIC BLOOD PRESSURE: 124 MMHG

## 2024-10-07 DIAGNOSIS — E78.2 MIXED HYPERLIPIDEMIA: Primary | ICD-10-CM

## 2024-10-07 DIAGNOSIS — J30.9 ALLERGIC RHINITIS, UNSPECIFIED SEASONALITY, UNSPECIFIED TRIGGER: ICD-10-CM

## 2024-10-07 DIAGNOSIS — E11.69 TYPE 2 DIABETES MELLITUS WITH OTHER SPECIFIED COMPLICATION, UNSPECIFIED WHETHER LONG TERM INSULIN USE (MULTI): ICD-10-CM

## 2024-10-07 DIAGNOSIS — N40.1 BPH WITH OBSTRUCTION/LOWER URINARY TRACT SYMPTOMS: ICD-10-CM

## 2024-10-07 DIAGNOSIS — J44.9 CHRONIC OBSTRUCTIVE PULMONARY DISEASE, UNSPECIFIED COPD TYPE (MULTI): ICD-10-CM

## 2024-10-07 DIAGNOSIS — K21.9 GASTROESOPHAGEAL REFLUX DISEASE WITHOUT ESOPHAGITIS: ICD-10-CM

## 2024-10-07 DIAGNOSIS — N52.9 MALE ERECTILE DYSFUNCTION, UNSPECIFIED: ICD-10-CM

## 2024-10-07 DIAGNOSIS — G89.29 CHRONIC BILATERAL LOW BACK PAIN WITH BILATERAL SCIATICA: ICD-10-CM

## 2024-10-07 DIAGNOSIS — H90.3 SENSORINEURAL HEARING LOSS, BILATERAL: ICD-10-CM

## 2024-10-07 DIAGNOSIS — N13.8 BPH WITH OBSTRUCTION/LOWER URINARY TRACT SYMPTOMS: ICD-10-CM

## 2024-10-07 DIAGNOSIS — M54.41 CHRONIC BILATERAL LOW BACK PAIN WITH BILATERAL SCIATICA: ICD-10-CM

## 2024-10-07 DIAGNOSIS — Z23 NEED FOR INFLUENZA VACCINATION: ICD-10-CM

## 2024-10-07 DIAGNOSIS — M54.42 CHRONIC BILATERAL LOW BACK PAIN WITH BILATERAL SCIATICA: ICD-10-CM

## 2024-10-07 DIAGNOSIS — I10 PRIMARY HYPERTENSION: ICD-10-CM

## 2024-10-07 DIAGNOSIS — F17.200 TOBACCO USE DISORDER: ICD-10-CM

## 2024-10-07 DIAGNOSIS — F32.A DEPRESSION, UNSPECIFIED DEPRESSION TYPE: ICD-10-CM

## 2024-10-07 PROBLEM — H93.19 SUBJECTIVE TINNITUS: Status: ACTIVE | Noted: 2024-10-07

## 2024-10-07 PROBLEM — A49.02 METHICILLIN RESISTANT STAPHYLOCOCCUS AUREUS INFECTION: Status: ACTIVE | Noted: 2024-10-07

## 2024-10-07 PROBLEM — G56.00 CARPAL TUNNEL SYNDROME: Status: ACTIVE | Noted: 2024-10-07

## 2024-10-07 PROBLEM — T81.42XA DEEP INCISIONAL SURGICAL SITE INFECTION: Status: ACTIVE | Noted: 2024-10-07

## 2024-10-07 LAB — POC HEMOGLOBIN A1C: 7.8 % (ref 4.2–6.5)

## 2024-10-07 PROCEDURE — 90656 IIV3 VACC NO PRSV 0.5 ML IM: CPT | Performed by: FAMILY MEDICINE

## 2024-10-07 PROCEDURE — 80053 COMPREHEN METABOLIC PANEL: CPT

## 2024-10-07 PROCEDURE — 1159F MED LIST DOCD IN RCRD: CPT | Performed by: FAMILY MEDICINE

## 2024-10-07 PROCEDURE — 80061 LIPID PANEL: CPT

## 2024-10-07 PROCEDURE — 1123F ACP DISCUSS/DSCN MKR DOCD: CPT | Performed by: FAMILY MEDICINE

## 2024-10-07 PROCEDURE — 3008F BODY MASS INDEX DOCD: CPT | Performed by: FAMILY MEDICINE

## 2024-10-07 PROCEDURE — 3078F DIAST BP <80 MM HG: CPT | Performed by: FAMILY MEDICINE

## 2024-10-07 PROCEDURE — 4004F PT TOBACCO SCREEN RCVD TLK: CPT | Performed by: FAMILY MEDICINE

## 2024-10-07 PROCEDURE — 83036 HEMOGLOBIN GLYCOSYLATED A1C: CPT | Performed by: FAMILY MEDICINE

## 2024-10-07 PROCEDURE — G0008 ADMIN INFLUENZA VIRUS VAC: HCPCS | Performed by: FAMILY MEDICINE

## 2024-10-07 PROCEDURE — 4010F ACE/ARB THERAPY RXD/TAKEN: CPT | Performed by: FAMILY MEDICINE

## 2024-10-07 PROCEDURE — 1170F FXNL STATUS ASSESSED: CPT | Performed by: FAMILY MEDICINE

## 2024-10-07 PROCEDURE — 1158F ADVNC CARE PLAN TLK DOCD: CPT | Performed by: FAMILY MEDICINE

## 2024-10-07 PROCEDURE — 3074F SYST BP LT 130 MM HG: CPT | Performed by: FAMILY MEDICINE

## 2024-10-07 PROCEDURE — G0439 PPPS, SUBSEQ VISIT: HCPCS | Performed by: FAMILY MEDICINE

## 2024-10-07 PROCEDURE — 99215 OFFICE O/P EST HI 40 MIN: CPT | Performed by: FAMILY MEDICINE

## 2024-10-07 RX ORDER — IPRATROPIUM BROMIDE 21 UG/1
2 SPRAY, METERED NASAL EVERY 12 HOURS
Qty: 30 ML | Refills: 12 | Status: SHIPPED | OUTPATIENT
Start: 2024-10-07 | End: 2025-10-07

## 2024-10-07 RX ORDER — FLUTICASONE PROPIONATE AND SALMETEROL 55; 14 UG/1; UG/1
1 POWDER, METERED RESPIRATORY (INHALATION) 2 TIMES DAILY
Qty: 1 EACH | Refills: 2 | Status: SHIPPED | OUTPATIENT
Start: 2024-10-07 | End: 2025-10-07

## 2024-10-07 RX ORDER — EMPAGLIFLOZIN 10 MG/1
10 TABLET, FILM COATED ORAL DAILY
Qty: 90 TABLET | Refills: 1 | OUTPATIENT
Start: 2024-10-07

## 2024-10-07 RX ORDER — GABAPENTIN 300 MG/1
300 CAPSULE ORAL 2 TIMES DAILY
Qty: 180 CAPSULE | Refills: 1 | Status: SHIPPED | OUTPATIENT
Start: 2024-10-07 | End: 2025-04-05

## 2024-10-07 ASSESSMENT — PATIENT HEALTH QUESTIONNAIRE - PHQ9
SUM OF ALL RESPONSES TO PHQ9 QUESTIONS 1 AND 2: 0
2. FEELING DOWN, DEPRESSED OR HOPELESS: NOT AT ALL
1. LITTLE INTEREST OR PLEASURE IN DOING THINGS: NOT AT ALL

## 2024-10-07 ASSESSMENT — ACTIVITIES OF DAILY LIVING (ADL)
GROCERY_SHOPPING: INDEPENDENT
MANAGING_FINANCES: INDEPENDENT
TAKING_MEDICATION: INDEPENDENT
DOING_HOUSEWORK: INDEPENDENT
BATHING: INDEPENDENT
DRESSING: INDEPENDENT

## 2024-10-07 ASSESSMENT — ENCOUNTER SYMPTOMS
GASTROINTESTINAL NEGATIVE: 1
CARDIOVASCULAR NEGATIVE: 1
CONSTITUTIONAL NEGATIVE: 1
DEPRESSION: 0
NECK PAIN: 1
LOSS OF SENSATION IN FEET: 0
OCCASIONAL FEELINGS OF UNSTEADINESS: 1
WHEEZING: 1
ARTHRALGIAS: 1
NECK STIFFNESS: 1
NEUROLOGICAL NEGATIVE: 1

## 2024-10-07 NOTE — PROGRESS NOTES
Subjective   Reason for Visit: Sarthak Portillo is an 69 y.o. male here for a Medicare Wellness visit.      Pt comes in for wellness exam. Pt states he has had a headache on the right side going into his eye and into the back of his head.          HPI    Patient Care Team:  Ayo Zuniga MD as PCP - General (Family Medicine)  Ayo Zuniga MD as PCP - [facility]     Review of Systems    Objective   Vitals:  There were no vitals taken for this visit.      Physical Exam    Assessment & Plan  Type 2 diabetes mellitus with other specified complication, unspecified whether long term insulin use (Multi)    Orders:    POCT glycosylated hemoglobin (Hb A1C) manually resulted    Need for influenza vaccination    Orders:    Flu vaccine, trivalent, preservative free, age 6 months and greater (Fluraix/Fluzone/Flulaval)    Male erectile dysfunction, unspecified

## 2024-10-07 NOTE — PROGRESS NOTES
Subjective   Patient ID: Sarthak Portillo is a 69 y.o. male who presents for Medicare Annual Wellness Visit Subsequent.    HPI   Htn, copd, depression and chronic pain , chol, bph , gerd  Review of Systems   Constitutional: Negative.    HENT: Negative.     Respiratory:  Positive for wheezing.    Cardiovascular: Negative.    Gastrointestinal: Negative.    Musculoskeletal:  Positive for arthralgias, neck pain and neck stiffness.   Neurological: Negative.        Objective   /72   Pulse 84   Ht 1.829 m (6')   Wt 113 kg (250 lb)   BMI 33.91 kg/m²     Physical Exam  Vitals reviewed.   Constitutional:       Appearance: Normal appearance. He is normal weight.   Eyes:      Extraocular Movements: Extraocular movements intact.      Conjunctiva/sclera: Conjunctivae normal.      Pupils: Pupils are equal, round, and reactive to light.   Cardiovascular:      Rate and Rhythm: Normal rate and regular rhythm.      Pulses: Normal pulses.      Heart sounds: Normal heart sounds.   Pulmonary:      Effort: Pulmonary effort is normal.      Breath sounds: Normal breath sounds.   Abdominal:      General: Bowel sounds are normal.      Palpations: Abdomen is soft.   Musculoskeletal:         General: Normal range of motion.   Skin:     General: Skin is warm and dry.   Neurological:      General: No focal deficit present.      Mental Status: He is alert and oriented to person, place, and time. Mental status is at baseline.         Assessment/Plan   Problem List Items Addressed This Visit             ICD-10-CM    BPH with obstruction/lower urinary tract symptoms N40.1, N13.8    Chronic back pain M54.9, G89.29    COPD (chronic obstructive pulmonary disease) (Multi) J44.9    Depression F32.A    Esophageal reflux K21.9    Hyperlipidemia - Primary E78.5    Hypertension I10    Sensorineural hearing loss, bilateral H90.3    Tobacco use disorder F17.200    Type 2 diabetes mellitus E11.9       Orders:    POCT glycosylated hemoglobin (Hb A1C)  manually resulted           Relevant Orders    POCT glycosylated hemoglobin (Hb A1C) manually resulted     Other Visit Diagnoses         Codes    Need for influenza vaccination     Z23    Relevant Orders    Flu vaccine, trivalent, preservative free, age 6 months and greater (Fluraix/Fluzone/Flulaval) (Completed)    Male erectile dysfunction, unspecified     N52.9        Spent time discussing smoking cessation   Aortic stenosis will cont echo yearly   Chronic ha daily  Dm uncont will increase jardiance to 25  Gerd stable   Chol stable ccc and fu  Due for psa   Pt spent 40 min and patent care and documentation,

## 2024-10-08 ENCOUNTER — TELEPHONE (OUTPATIENT)
Dept: PRIMARY CARE | Facility: CLINIC | Age: 69
End: 2024-10-08
Payer: MEDICARE

## 2024-10-08 LAB
ALBUMIN SERPL BCP-MCNC: 4.3 G/DL (ref 3.4–5)
ALP SERPL-CCNC: 62 U/L (ref 33–136)
ALT SERPL W P-5'-P-CCNC: 35 U/L (ref 10–52)
ANION GAP SERPL CALC-SCNC: 20 MMOL/L (ref 10–20)
AST SERPL W P-5'-P-CCNC: 19 U/L (ref 9–39)
BILIRUB SERPL-MCNC: 0.8 MG/DL (ref 0–1.2)
BUN SERPL-MCNC: 20 MG/DL (ref 6–23)
CALCIUM SERPL-MCNC: 9.9 MG/DL (ref 8.6–10.6)
CHLORIDE SERPL-SCNC: 98 MMOL/L (ref 98–107)
CHOLEST SERPL-MCNC: 159 MG/DL (ref 0–199)
CHOLESTEROL/HDL RATIO: 4.8
CO2 SERPL-SCNC: 24 MMOL/L (ref 21–32)
CREAT SERPL-MCNC: 0.85 MG/DL (ref 0.5–1.3)
EGFRCR SERPLBLD CKD-EPI 2021: >90 ML/MIN/1.73M*2
GLUCOSE SERPL-MCNC: 137 MG/DL (ref 74–99)
HDLC SERPL-MCNC: 33 MG/DL
LDLC SERPL CALC-MCNC: 55 MG/DL
NON HDL CHOLESTEROL: 126 MG/DL (ref 0–149)
POTASSIUM SERPL-SCNC: 4.1 MMOL/L (ref 3.5–5.3)
PROT SERPL-MCNC: 7 G/DL (ref 6.4–8.2)
SODIUM SERPL-SCNC: 138 MMOL/L (ref 136–145)
TRIGL SERPL-MCNC: 355 MG/DL (ref 0–149)
VLDL: 71 MG/DL (ref 0–40)

## 2024-10-29 ENCOUNTER — APPOINTMENT (OUTPATIENT)
Dept: OTOLARYNGOLOGY | Facility: CLINIC | Age: 69
End: 2024-10-29
Payer: MEDICARE

## 2024-10-31 DIAGNOSIS — J30.9 ALLERGIC RHINITIS, UNSPECIFIED SEASONALITY, UNSPECIFIED TRIGGER: ICD-10-CM

## 2024-10-31 RX ORDER — IPRATROPIUM BROMIDE 21 UG/1
2 SPRAY, METERED NASAL EVERY 12 HOURS
Qty: 30 ML | Refills: 3 | Status: SHIPPED | OUTPATIENT
Start: 2024-10-31 | End: 2025-10-31

## 2024-11-03 DIAGNOSIS — I10 ESSENTIAL (PRIMARY) HYPERTENSION: ICD-10-CM

## 2024-11-05 RX ORDER — LISINOPRIL 40 MG/1
TABLET ORAL
Qty: 90 TABLET | Refills: 0 | Status: SHIPPED | OUTPATIENT
Start: 2024-11-05

## 2024-11-13 DIAGNOSIS — J30.9 ALLERGIC RHINITIS, UNSPECIFIED SEASONALITY, UNSPECIFIED TRIGGER: ICD-10-CM

## 2024-11-14 RX ORDER — IPRATROPIUM BROMIDE 21 UG/1
2 SPRAY, METERED NASAL EVERY 12 HOURS
Qty: 30 ML | Refills: 1 | Status: SHIPPED | OUTPATIENT
Start: 2024-11-14 | End: 2025-11-14

## 2024-11-22 DIAGNOSIS — M17.10 UNILATERAL PRIMARY OSTEOARTHRITIS, UNSPECIFIED KNEE: ICD-10-CM

## 2024-11-22 DIAGNOSIS — Z00.00 ENCOUNTER FOR GENERAL ADULT MEDICAL EXAMINATION WITHOUT ABNORMAL FINDINGS: ICD-10-CM

## 2024-11-22 DIAGNOSIS — E78.5 DYSLIPIDEMIA: ICD-10-CM

## 2024-11-22 RX ORDER — FUROSEMIDE 40 MG/1
TABLET ORAL
Qty: 90 TABLET | Refills: 0 | Status: SHIPPED | OUTPATIENT
Start: 2024-11-22

## 2024-11-22 RX ORDER — DULOXETIN HYDROCHLORIDE 60 MG/1
60 CAPSULE, DELAYED RELEASE ORAL DAILY
Qty: 90 CAPSULE | Refills: 0 | Status: SHIPPED | OUTPATIENT
Start: 2024-11-22

## 2024-11-22 RX ORDER — ROSUVASTATIN CALCIUM 20 MG/1
20 TABLET, COATED ORAL DAILY
Qty: 90 TABLET | Refills: 0 | Status: SHIPPED | OUTPATIENT
Start: 2024-11-22

## 2024-11-23 DIAGNOSIS — J30.9 ALLERGIC RHINITIS, UNSPECIFIED SEASONALITY, UNSPECIFIED TRIGGER: ICD-10-CM

## 2024-11-25 RX ORDER — IPRATROPIUM BROMIDE 21 UG/1
2 SPRAY, METERED NASAL EVERY 12 HOURS
Qty: 30 ML | Refills: 1 | Status: SHIPPED | OUTPATIENT
Start: 2024-11-25 | End: 2025-11-25

## 2024-11-26 ENCOUNTER — APPOINTMENT (OUTPATIENT)
Dept: DERMATOLOGY | Facility: CLINIC | Age: 69
End: 2024-11-26
Payer: MEDICARE

## 2024-11-26 DIAGNOSIS — Z51.89 VISIT FOR WOUND CHECK: ICD-10-CM

## 2024-11-26 PROCEDURE — 17110 DESTRUCTION B9 LES UP TO 14: CPT | Performed by: DERMATOLOGY

## 2024-11-26 PROCEDURE — 4010F ACE/ARB THERAPY RXD/TAKEN: CPT | Performed by: DERMATOLOGY

## 2024-11-26 PROCEDURE — 3048F LDL-C <100 MG/DL: CPT | Performed by: DERMATOLOGY

## 2024-11-26 NOTE — PROGRESS NOTES
Office Follow Up Note    Visit Summary  Chief Complaint    1. Complaint Wound check.    Sarthak Portillo is a 69 y.o. male who presents for 8 week follow up after surgery for hot loop. The patient has no concerns today.     With respect to a separate issue, he states he has a scaly stuck on appearing papule on the inner rim of his left ala. States it is irritating to him.     Location Operation site location: nose    On exam,  Mr. Portillo is well-appearing and in no apparent distress. The surgical site appears clean with minimal to no erythema.     Assessment and Plan:    # Wound Check (from hot loop)   History of skin cancer requiring ongoing monitoring for recurrence and additional lesion development.   The patient was reassured that the wound is healing appropriately.     # ISK  ISK on the inner left alar rim was treated with 1 cycle of LN. Patient was informed of side effects including pain, blistering, oozing, and persistence of the lesion. Patient was counseled on wound care.     The patient was advised on the importance of routine skin monitoring including follow up with general dermatology and instructed to call with any further concerns. The patient will return as needed    Eulogio Jacobson MD, PhD

## 2024-12-05 DIAGNOSIS — K21.9 GASTROESOPHAGEAL REFLUX DISEASE WITHOUT ESOPHAGITIS: ICD-10-CM

## 2024-12-05 RX ORDER — OMEPRAZOLE 20 MG/1
20 CAPSULE, DELAYED RELEASE ORAL DAILY
Qty: 90 CAPSULE | Refills: 0 | Status: SHIPPED | OUTPATIENT
Start: 2024-12-05

## 2024-12-09 DIAGNOSIS — J30.9 ALLERGIC RHINITIS, UNSPECIFIED SEASONALITY, UNSPECIFIED TRIGGER: ICD-10-CM

## 2024-12-09 RX ORDER — IPRATROPIUM BROMIDE 21 UG/1
2 SPRAY, METERED NASAL EVERY 12 HOURS
Qty: 30 ML | Refills: 1 | Status: SHIPPED | OUTPATIENT
Start: 2024-12-09 | End: 2025-12-09

## 2024-12-13 DIAGNOSIS — J44.9 CHRONIC OBSTRUCTIVE PULMONARY DISEASE, UNSPECIFIED COPD TYPE (MULTI): ICD-10-CM

## 2024-12-13 RX ORDER — FLUTICASONE FUROATE AND VILANTEROL 200; 25 UG/1; UG/1
1 POWDER RESPIRATORY (INHALATION) DAILY
Qty: 28 EACH | Refills: 3 | Status: SHIPPED | OUTPATIENT
Start: 2024-12-13

## 2024-12-16 RX ORDER — MOMETASONE FUROATE AND FORMOTEROL FUMARATE DIHYDRATE 200; 5 UG/1; UG/1
2 AEROSOL RESPIRATORY (INHALATION)
Qty: 13 G | Refills: 2 | Status: SHIPPED | OUTPATIENT
Start: 2024-12-16

## 2024-12-22 DIAGNOSIS — T84.51XD INFECTION ASSOCIATED WITH INTERNAL RIGHT HIP PROSTHESIS, SUBSEQUENT ENCOUNTER: ICD-10-CM

## 2024-12-23 RX ORDER — AMOXICILLIN 875 MG/1
875 TABLET, FILM COATED ORAL
Qty: 180 TABLET | Refills: 1 | Status: SHIPPED | OUTPATIENT
Start: 2024-12-23

## 2025-01-06 ENCOUNTER — TELEPHONE (OUTPATIENT)
Dept: INFECTIOUS DISEASES | Facility: HOSPITAL | Age: 70
End: 2025-01-06
Payer: MEDICARE

## 2025-01-06 NOTE — TELEPHONE ENCOUNTER
Patient want to know does he have to do anything differently since he is due to have hand surgery and is currently on abx

## 2025-01-07 ENCOUNTER — APPOINTMENT (OUTPATIENT)
Dept: PRIMARY CARE | Facility: CLINIC | Age: 70
End: 2025-01-07
Payer: MEDICARE

## 2025-01-07 VITALS
DIASTOLIC BLOOD PRESSURE: 73 MMHG | HEIGHT: 72 IN | HEART RATE: 82 BPM | WEIGHT: 250 LBS | BODY MASS INDEX: 33.86 KG/M2 | SYSTOLIC BLOOD PRESSURE: 124 MMHG

## 2025-01-07 DIAGNOSIS — I10 PRIMARY HYPERTENSION: ICD-10-CM

## 2025-01-07 DIAGNOSIS — K21.9 GASTROESOPHAGEAL REFLUX DISEASE WITHOUT ESOPHAGITIS: ICD-10-CM

## 2025-01-07 DIAGNOSIS — M54.42 CHRONIC BILATERAL LOW BACK PAIN WITH BILATERAL SCIATICA: ICD-10-CM

## 2025-01-07 DIAGNOSIS — N40.1 BENIGN PROSTATIC HYPERPLASIA WITH URINARY FREQUENCY: ICD-10-CM

## 2025-01-07 DIAGNOSIS — J44.9 CHRONIC OBSTRUCTIVE PULMONARY DISEASE, UNSPECIFIED COPD TYPE (MULTI): ICD-10-CM

## 2025-01-07 DIAGNOSIS — R35.0 BENIGN PROSTATIC HYPERPLASIA WITH URINARY FREQUENCY: ICD-10-CM

## 2025-01-07 DIAGNOSIS — E78.2 MIXED HYPERLIPIDEMIA: Primary | ICD-10-CM

## 2025-01-07 DIAGNOSIS — E11.69 TYPE 2 DIABETES MELLITUS WITH OTHER SPECIFIED COMPLICATION, UNSPECIFIED WHETHER LONG TERM INSULIN USE (MULTI): ICD-10-CM

## 2025-01-07 DIAGNOSIS — M54.41 CHRONIC BILATERAL LOW BACK PAIN WITH BILATERAL SCIATICA: ICD-10-CM

## 2025-01-07 DIAGNOSIS — F32.A DEPRESSION, UNSPECIFIED DEPRESSION TYPE: ICD-10-CM

## 2025-01-07 DIAGNOSIS — M25.50 ARTHRALGIA, UNSPECIFIED JOINT: ICD-10-CM

## 2025-01-07 DIAGNOSIS — G89.29 CHRONIC BILATERAL LOW BACK PAIN WITH BILATERAL SCIATICA: ICD-10-CM

## 2025-01-07 PROBLEM — F17.200 TOBACCO USE DISORDER: Status: RESOLVED | Noted: 2023-08-23 | Resolved: 2025-01-07

## 2025-01-07 LAB
POC HEMOGLOBIN A1C: 7.9 % (ref 4.2–6.5)
PSA SERPL-MCNC: 1.69 NG/ML

## 2025-01-07 PROCEDURE — 3008F BODY MASS INDEX DOCD: CPT | Performed by: FAMILY MEDICINE

## 2025-01-07 PROCEDURE — 1159F MED LIST DOCD IN RCRD: CPT | Performed by: FAMILY MEDICINE

## 2025-01-07 PROCEDURE — 3078F DIAST BP <80 MM HG: CPT | Performed by: FAMILY MEDICINE

## 2025-01-07 PROCEDURE — 99214 OFFICE O/P EST MOD 30 MIN: CPT | Performed by: FAMILY MEDICINE

## 2025-01-07 PROCEDURE — 83036 HEMOGLOBIN GLYCOSYLATED A1C: CPT | Performed by: FAMILY MEDICINE

## 2025-01-07 PROCEDURE — 84153 ASSAY OF PSA TOTAL: CPT

## 2025-01-07 PROCEDURE — G2211 COMPLEX E/M VISIT ADD ON: HCPCS | Performed by: FAMILY MEDICINE

## 2025-01-07 PROCEDURE — 3074F SYST BP LT 130 MM HG: CPT | Performed by: FAMILY MEDICINE

## 2025-01-07 RX ORDER — AMLODIPINE AND BENAZEPRIL HYDROCHLORIDE 5; 20 MG/1; MG/1
1 CAPSULE ORAL DAILY
Qty: 100 CAPSULE | Refills: 3 | Status: SHIPPED | OUTPATIENT
Start: 2025-01-07 | End: 2026-02-11

## 2025-01-07 ASSESSMENT — ENCOUNTER SYMPTOMS
LOSS OF SENSATION IN FEET: 0
DEPRESSION: 0
GASTROINTESTINAL NEGATIVE: 1
RESPIRATORY NEGATIVE: 1
CARDIOVASCULAR NEGATIVE: 1
FATIGUE: 1
OCCASIONAL FEELINGS OF UNSTEADINESS: 0
HEADACHES: 1
ARTHRALGIAS: 1
BACK PAIN: 1

## 2025-01-07 NOTE — PROGRESS NOTES
Subjective   Patient ID: Sarthak Portillo is a 69 y.o. male who presents for No chief complaint on file..    HPI dm, copd, htn, chol, urge incontinence, sleep apnea    Review of Systems   Constitutional:  Positive for fatigue.   HENT: Negative.     Respiratory: Negative.     Cardiovascular: Negative.    Gastrointestinal: Negative.    Musculoskeletal:  Positive for arthralgias, back pain and gait problem.        Using cane   Neurological:  Positive for headaches.       Objective   /73   Pulse 82   Ht 1.829 m (6')   Wt 113 kg (250 lb)   BMI 33.91 kg/m²     Physical Exam  Vitals reviewed.   Constitutional:       Appearance: Normal appearance. He is normal weight.   Eyes:      Extraocular Movements: Extraocular movements intact.      Conjunctiva/sclera: Conjunctivae normal.      Pupils: Pupils are equal, round, and reactive to light.   Cardiovascular:      Rate and Rhythm: Normal rate and regular rhythm.      Pulses: Normal pulses.      Heart sounds: Normal heart sounds.   Pulmonary:      Effort: Pulmonary effort is normal.      Breath sounds: Normal breath sounds.   Abdominal:      General: Bowel sounds are normal.      Palpations: Abdomen is soft.   Musculoskeletal:         General: Normal range of motion.      Comments: Severe arthritis of joints   Skin:     General: Skin is warm and dry.   Neurological:      General: No focal deficit present.      Mental Status: He is alert and oriented to person, place, and time. Mental status is at baseline.         Assessment/Plan   Problem List Items Addressed This Visit             ICD-10-CM    Arthralgia M25.50    Chronic back pain M54.9, G89.29    COPD (chronic obstructive pulmonary disease) (Multi) J44.9    Depression F32.A    Esophageal reflux K21.9    Hyperlipidemia - Primary E78.5    Hypertension I10    Type 2 diabetes mellitus E11.9    Relevant Orders    POCT glycosylated hemoglobin (Hb A1C) manually resulted (Completed)     Other Visit Diagnoses         Codes     Benign prostatic hyperplasia with urinary frequency     N40.1, R35.0          Dm will try low carb approach and recheck in 6wks if no imp will add glipizide  Htn stable but migraines will change lisionpril to nyla 5/20 and see how this effect freq of migraine and check bp for eval of tolerance for bp standpoint  Copd pt stopped smoking jan 1 which is awesome, pt at this time feels he is doing well without nicotene replacement so will not use medication and will try to control diet with this cessation

## 2025-01-09 ENCOUNTER — TELEPHONE (OUTPATIENT)
Dept: PRIMARY CARE | Facility: CLINIC | Age: 70
End: 2025-01-09
Payer: MEDICARE

## 2025-01-09 DIAGNOSIS — J30.9 ALLERGIC RHINITIS, UNSPECIFIED SEASONALITY, UNSPECIFIED TRIGGER: ICD-10-CM

## 2025-01-09 NOTE — TELEPHONE ENCOUNTER
----- Message from Cory KELLER sent at 1/9/2025  8:23 AM EST -----      ----- Message -----  From: Ayo Zuniga MD  Sent: 1/8/2025   9:02 PM EST  To: Willow Chandler; Cory Roche MA    All results are stable  no change of psa levels

## 2025-01-10 RX ORDER — IPRATROPIUM BROMIDE 21 UG/1
2 SPRAY, METERED NASAL EVERY 12 HOURS
Qty: 30 ML | Refills: 1 | Status: SHIPPED | OUTPATIENT
Start: 2025-01-10 | End: 2026-01-10

## 2025-01-14 ENCOUNTER — APPOINTMENT (OUTPATIENT)
Dept: INFECTIOUS DISEASES | Facility: CLINIC | Age: 70
End: 2025-01-14
Payer: MEDICARE

## 2025-01-17 DIAGNOSIS — N40.1 BPH WITH OBSTRUCTION/LOWER URINARY TRACT SYMPTOMS: ICD-10-CM

## 2025-01-17 DIAGNOSIS — N13.8 BPH WITH OBSTRUCTION/LOWER URINARY TRACT SYMPTOMS: ICD-10-CM

## 2025-01-18 DIAGNOSIS — J30.9 ALLERGIC RHINITIS, UNSPECIFIED SEASONALITY, UNSPECIFIED TRIGGER: ICD-10-CM

## 2025-01-21 RX ORDER — IPRATROPIUM BROMIDE 21 UG/1
2 SPRAY, METERED NASAL EVERY 12 HOURS
Qty: 30 ML | Refills: 1 | Status: SHIPPED | OUTPATIENT
Start: 2025-01-21 | End: 2026-01-21

## 2025-01-22 ENCOUNTER — APPOINTMENT (OUTPATIENT)
Dept: DERMATOLOGY | Facility: CLINIC | Age: 70
End: 2025-01-22
Payer: MEDICARE

## 2025-01-24 RX ORDER — TAMSULOSIN HYDROCHLORIDE 0.4 MG/1
0.4 CAPSULE ORAL DAILY
Qty: 90 CAPSULE | Refills: 3 | Status: SHIPPED | OUTPATIENT
Start: 2025-01-24

## 2025-01-27 ENCOUNTER — APPOINTMENT (OUTPATIENT)
Dept: RADIOLOGY | Facility: CLINIC | Age: 70
End: 2025-01-27
Payer: MEDICARE

## 2025-01-28 ENCOUNTER — APPOINTMENT (OUTPATIENT)
Dept: PRIMARY CARE | Facility: CLINIC | Age: 70
End: 2025-01-28
Payer: MEDICARE

## 2025-01-28 ENCOUNTER — HOSPITAL ENCOUNTER (OUTPATIENT)
Dept: RADIOLOGY | Facility: CLINIC | Age: 70
Discharge: HOME | End: 2025-01-28
Payer: MEDICARE

## 2025-01-28 DIAGNOSIS — C64.9 RENAL CELL CARCINOMA, UNSPECIFIED LATERALITY (MULTI): ICD-10-CM

## 2025-01-28 PROCEDURE — 76770 US EXAM ABDO BACK WALL COMP: CPT

## 2025-01-28 PROCEDURE — 76770 US EXAM ABDO BACK WALL COMP: CPT | Performed by: RADIOLOGY

## 2025-02-07 ENCOUNTER — APPOINTMENT (OUTPATIENT)
Dept: INFECTIOUS DISEASES | Facility: CLINIC | Age: 70
End: 2025-02-07
Payer: MEDICARE

## 2025-02-07 DIAGNOSIS — T84.51XD INFECTION ASSOCIATED WITH INTERNAL RIGHT HIP PROSTHESIS, SUBSEQUENT ENCOUNTER: Primary | ICD-10-CM

## 2025-02-07 PROCEDURE — 99213 OFFICE O/P EST LOW 20 MIN: CPT | Performed by: INTERNAL MEDICINE

## 2025-02-07 RX ORDER — AMOXICILLIN 875 MG/1
875 TABLET, FILM COATED ORAL
Qty: 180 TABLET | Refills: 3 | Status: SHIPPED | OUTPATIENT
Start: 2025-02-07 | End: 2025-05-08

## 2025-02-07 NOTE — PROGRESS NOTES
Update February 7, 2025  Virtual visit, patient continues on chronic suppressive therapy with amoxicillin.  Indicates excellent tolerance with no fever chills rash nausea vomiting diarrhea.  Had carpal tunnel surgery without incident.  Stopped smoking!    Doing well on virtual interaction    Assessment-continue chronic suppressive therapy with amoxicillin.  Patient is discussed this with me in 2023 and in more depth with his prior infectious ease provider.  Understands the rationale and plan of care.  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  We saw him for the first time years ago.  Previously followed by colleagues in the left .  He has been on amoxicillin since 2020.  We reviewed recent labs which look good.  Patient says he is doing well.  No joint swelling redness erythema.  Hip is doing okay.  Having some shoulder problems and might need surgery.    Virtual visit doing well.    As per the plan from before we will continue chronic suppressive therapy with amoxicillin.  Should he need any further orthopedic procedures I told him I be happy to communicate that as long as he is on suppressive amoxicillin therapy if there is any infection and sent a dormant form and biofilm and would not provide any risk to the patient for having additional surgical procedure.  We refilled his amoxicillin for a year we will follow-up virtual visit December 5, 2024

## 2025-02-13 ENCOUNTER — APPOINTMENT (OUTPATIENT)
Dept: PRIMARY CARE | Facility: CLINIC | Age: 70
End: 2025-02-13
Payer: MEDICARE

## 2025-02-13 DIAGNOSIS — J30.9 ALLERGIC RHINITIS, UNSPECIFIED SEASONALITY, UNSPECIFIED TRIGGER: ICD-10-CM

## 2025-02-13 RX ORDER — IPRATROPIUM BROMIDE 21 UG/1
2 SPRAY, METERED NASAL EVERY 12 HOURS
Refills: 1 | OUTPATIENT
Start: 2025-02-13 | End: 2026-02-13

## 2025-02-15 DIAGNOSIS — J44.9 CHRONIC OBSTRUCTIVE PULMONARY DISEASE, UNSPECIFIED COPD TYPE (MULTI): ICD-10-CM

## 2025-02-17 DIAGNOSIS — J44.9 CHRONIC OBSTRUCTIVE PULMONARY DISEASE, UNSPECIFIED COPD TYPE (MULTI): ICD-10-CM

## 2025-02-17 RX ORDER — FLUTICASONE PROPIONATE AND SALMETEROL 55; 14 UG/1; UG/1
1 POWDER, METERED RESPIRATORY (INHALATION) 2 TIMES DAILY
Qty: 1 EACH | Refills: 2 | Status: SHIPPED | OUTPATIENT
Start: 2025-02-17 | End: 2025-02-17

## 2025-02-17 RX ORDER — BUDESONIDE AND FORMOTEROL FUMARATE DIHYDRATE 80; 4.5 UG/1; UG/1
2 AEROSOL RESPIRATORY (INHALATION)
Qty: 10.2 G | Refills: 2 | Status: SHIPPED | OUTPATIENT
Start: 2025-02-17

## 2025-02-24 ENCOUNTER — APPOINTMENT (OUTPATIENT)
Dept: PRIMARY CARE | Facility: CLINIC | Age: 70
End: 2025-02-24
Payer: MEDICARE

## 2025-02-24 VITALS
HEART RATE: 100 BPM | SYSTOLIC BLOOD PRESSURE: 112 MMHG | HEIGHT: 72 IN | BODY MASS INDEX: 33.86 KG/M2 | WEIGHT: 250 LBS | DIASTOLIC BLOOD PRESSURE: 54 MMHG

## 2025-02-24 DIAGNOSIS — I10 PRIMARY HYPERTENSION: ICD-10-CM

## 2025-02-24 DIAGNOSIS — G89.29 CHRONIC PAIN OF RIGHT INGUINAL REGION: Primary | ICD-10-CM

## 2025-02-24 DIAGNOSIS — R10.31 CHRONIC PAIN OF RIGHT INGUINAL REGION: Primary | ICD-10-CM

## 2025-02-24 DIAGNOSIS — G43.E09 CHRONIC MIGRAINE WITH AURA WITHOUT STATUS MIGRAINOSUS, NOT INTRACTABLE: ICD-10-CM

## 2025-02-24 DIAGNOSIS — E11.69 TYPE 2 DIABETES MELLITUS WITH OTHER SPECIFIED COMPLICATION, UNSPECIFIED WHETHER LONG TERM INSULIN USE (MULTI): ICD-10-CM

## 2025-02-24 LAB — POC HEMOGLOBIN A1C: 8.4 % (ref 4.2–6.5)

## 2025-02-24 PROCEDURE — 3074F SYST BP LT 130 MM HG: CPT | Performed by: FAMILY MEDICINE

## 2025-02-24 PROCEDURE — G2211 COMPLEX E/M VISIT ADD ON: HCPCS | Performed by: FAMILY MEDICINE

## 2025-02-24 PROCEDURE — 3008F BODY MASS INDEX DOCD: CPT | Performed by: FAMILY MEDICINE

## 2025-02-24 PROCEDURE — 83036 HEMOGLOBIN GLYCOSYLATED A1C: CPT | Performed by: FAMILY MEDICINE

## 2025-02-24 PROCEDURE — 99214 OFFICE O/P EST MOD 30 MIN: CPT | Performed by: FAMILY MEDICINE

## 2025-02-24 PROCEDURE — 3078F DIAST BP <80 MM HG: CPT | Performed by: FAMILY MEDICINE

## 2025-02-24 PROCEDURE — 1159F MED LIST DOCD IN RCRD: CPT | Performed by: FAMILY MEDICINE

## 2025-02-24 RX ORDER — GLIPIZIDE 10 MG/1
10 TABLET, FILM COATED, EXTENDED RELEASE ORAL DAILY
Qty: 90 TABLET | Refills: 1 | Status: SHIPPED | OUTPATIENT
Start: 2025-02-24 | End: 2026-02-24

## 2025-02-24 RX ORDER — OXYCODONE HYDROCHLORIDE 5 MG/1
1 TABLET ORAL EVERY 6 HOURS PRN
COMMUNITY
Start: 2025-01-14 | End: 2025-02-24 | Stop reason: ALTCHOICE

## 2025-02-24 ASSESSMENT — ENCOUNTER SYMPTOMS
MUSCULOSKELETAL NEGATIVE: 1
CARDIOVASCULAR NEGATIVE: 1
LOSS OF SENSATION IN FEET: 0
CONSTITUTIONAL NEGATIVE: 1
NEUROLOGICAL NEGATIVE: 1
OCCASIONAL FEELINGS OF UNSTEADINESS: 0
RESPIRATORY NEGATIVE: 1
GASTROINTESTINAL NEGATIVE: 1
DEPRESSION: 0

## 2025-02-24 NOTE — PROGRESS NOTES
Subjective   Patient ID: Sarthak Portillo is a 69 y.o. male who presents for No chief complaint on file..    HPI fu htn, dm and having r inq pain and swelling no trauma has been going on for 2 mo    Review of Systems   Constitutional: Negative.    HENT: Negative.     Respiratory: Negative.     Cardiovascular: Negative.    Gastrointestinal: Negative.         Right inquinal pain and tend to palpation going into scrotum worse w positions, no change in urination and no other symptoms but unresoved   Musculoskeletal: Negative.    Neurological: Negative.        Objective   /54   Pulse 100   Ht 1.829 m (6')   Wt 113 kg (250 lb)   BMI 33.91 kg/m²     Physical Exam  Vitals reviewed.   Constitutional:       Appearance: Normal appearance. He is normal weight.   Eyes:      Extraocular Movements: Extraocular movements intact.      Conjunctiva/sclera: Conjunctivae normal.      Pupils: Pupils are equal, round, and reactive to light.   Cardiovascular:      Rate and Rhythm: Normal rate and regular rhythm.      Pulses: Normal pulses.      Heart sounds: Normal heart sounds.   Pulmonary:      Effort: Pulmonary effort is normal.      Breath sounds: Normal breath sounds.   Abdominal:      General: Bowel sounds are normal.      Palpations: Abdomen is soft.      Comments: R inguinal density w possbile protrusion w bearing down and possible hernia    Musculoskeletal:         General: Normal range of motion.   Skin:     General: Skin is warm and dry.   Neurological:      General: No focal deficit present.      Mental Status: He is alert and oriented to person, place, and time. Mental status is at baseline.         Assessment/Plan   Problem List Items Addressed This Visit             ICD-10-CM    Hypertension I10    Type 2 diabetes mellitus E11.9    Relevant Medications    glipiZIDE XL (Glucotrol XL) 10 mg 24 hr tablet    Other Relevant Orders    POCT glycosylated hemoglobin (Hb A1C) manually resulted (Completed)     Other Visit  Diagnoses         Codes    Chronic pain of right inguinal region    -  Primary R10.31, G89.29    Relevant Orders    CT pelvis wo IV contrast    Chronic migraine with aura without status migrainosus, not intractable     G43.E09        Migraines resolve w addition of amlodipine to bp med   Htn improve on nyla and tolerating well   Dm uncont on jardiance 25 will add glipizide 10 and fu  R inguinal tend and fulness on exam will get ct pelvis for furthe eval

## 2025-02-24 NOTE — PROGRESS NOTES
Pt comes in for 1 mo fu on dm. Pt states having some groin pain from his hip pains. Pt also states his breathing has improved.

## 2025-02-26 DIAGNOSIS — Z00.00 ENCOUNTER FOR GENERAL ADULT MEDICAL EXAMINATION WITHOUT ABNORMAL FINDINGS: ICD-10-CM

## 2025-02-26 DIAGNOSIS — E78.5 DYSLIPIDEMIA: ICD-10-CM

## 2025-02-26 DIAGNOSIS — M17.10 UNILATERAL PRIMARY OSTEOARTHRITIS, UNSPECIFIED KNEE: ICD-10-CM

## 2025-02-27 RX ORDER — FUROSEMIDE 40 MG/1
TABLET ORAL
Qty: 90 TABLET | Refills: 0 | Status: SHIPPED | OUTPATIENT
Start: 2025-02-27

## 2025-02-27 RX ORDER — DULOXETIN HYDROCHLORIDE 60 MG/1
60 CAPSULE, DELAYED RELEASE ORAL DAILY
Qty: 90 CAPSULE | Refills: 0 | Status: SHIPPED | OUTPATIENT
Start: 2025-02-27

## 2025-02-27 RX ORDER — ROSUVASTATIN CALCIUM 20 MG/1
20 TABLET, COATED ORAL DAILY
Qty: 90 TABLET | Refills: 0 | Status: SHIPPED | OUTPATIENT
Start: 2025-02-27

## 2025-03-01 ENCOUNTER — HOSPITAL ENCOUNTER (OUTPATIENT)
Dept: RADIOLOGY | Facility: CLINIC | Age: 70
End: 2025-03-01
Payer: MEDICARE

## 2025-03-04 ENCOUNTER — HOSPITAL ENCOUNTER (OUTPATIENT)
Dept: RADIOLOGY | Facility: CLINIC | Age: 70
Discharge: HOME | End: 2025-03-04
Payer: MEDICARE

## 2025-03-04 DIAGNOSIS — G89.29 CHRONIC PAIN OF RIGHT INGUINAL REGION: ICD-10-CM

## 2025-03-04 DIAGNOSIS — R10.31 CHRONIC PAIN OF RIGHT INGUINAL REGION: ICD-10-CM

## 2025-03-04 PROCEDURE — 72192 CT PELVIS W/O DYE: CPT

## 2025-03-05 DIAGNOSIS — K21.9 GASTROESOPHAGEAL REFLUX DISEASE WITHOUT ESOPHAGITIS: ICD-10-CM

## 2025-03-05 RX ORDER — OMEPRAZOLE 20 MG/1
20 CAPSULE, DELAYED RELEASE ORAL DAILY
Qty: 90 CAPSULE | Refills: 0 | Status: SHIPPED | OUTPATIENT
Start: 2025-03-05

## 2025-03-07 ENCOUNTER — TELEPHONE (OUTPATIENT)
Dept: PRIMARY CARE | Facility: CLINIC | Age: 70
End: 2025-03-07
Payer: MEDICARE

## 2025-03-07 NOTE — TELEPHONE ENCOUNTER
Discussed w pt will see his urologist for bladder and prostate eval and will refer to ortho for hip eval

## 2025-03-19 DIAGNOSIS — J30.9 ALLERGIC RHINITIS, UNSPECIFIED SEASONALITY, UNSPECIFIED TRIGGER: ICD-10-CM

## 2025-03-19 RX ORDER — IPRATROPIUM BROMIDE 21 UG/1
2 SPRAY, METERED NASAL EVERY 12 HOURS
Qty: 30 ML | Refills: 1 | Status: SHIPPED | OUTPATIENT
Start: 2025-03-19 | End: 2026-03-19

## 2025-03-28 ENCOUNTER — OFFICE VISIT (OUTPATIENT)
Dept: PRIMARY CARE | Facility: CLINIC | Age: 70
End: 2025-03-28
Payer: MEDICARE

## 2025-03-28 VITALS
SYSTOLIC BLOOD PRESSURE: 140 MMHG | WEIGHT: 250 LBS | DIASTOLIC BLOOD PRESSURE: 80 MMHG | HEART RATE: 90 BPM | BODY MASS INDEX: 33.86 KG/M2 | HEIGHT: 72 IN

## 2025-03-28 DIAGNOSIS — M70.21 OLECRANON BURSITIS OF RIGHT ELBOW: Primary | ICD-10-CM

## 2025-03-28 ASSESSMENT — ENCOUNTER SYMPTOMS
CARDIOVASCULAR NEGATIVE: 1
MUSCULOSKELETAL NEGATIVE: 1
NEUROLOGICAL NEGATIVE: 1
DEPRESSION: 0
LOSS OF SENSATION IN FEET: 0
CONSTITUTIONAL NEGATIVE: 1
OCCASIONAL FEELINGS OF UNSTEADINESS: 0
RESPIRATORY NEGATIVE: 1
GASTROINTESTINAL NEGATIVE: 1

## 2025-03-28 NOTE — PROGRESS NOTES
Subjective   Patient ID: Sarthak Portillo is a 70 y.o. male who presents for No chief complaint on file..    HPI pt w large bursa on r epidocnyle no trauma that he is aware of    Review of Systems   Constitutional: Negative.    HENT: Negative.     Respiratory: Negative.     Cardiovascular: Negative.    Gastrointestinal: Negative.    Musculoskeletal: Negative.         Large swoolen bursa r elbow    Neurological: Negative.    large bursa swelling on r elbow , no erythema, no spc pain but unconfortable due to fullness    Objective   /80   Pulse 90   Ht 1.829 m (6')   Wt 113 kg (250 lb)   BMI 33.91 kg/m²     Physical Exam  Vitals reviewed.   Constitutional:       Appearance: Normal appearance. He is normal weight.   Eyes:      Extraocular Movements: Extraocular movements intact.      Conjunctiva/sclera: Conjunctivae normal.      Pupils: Pupils are equal, round, and reactive to light.   Cardiovascular:      Rate and Rhythm: Normal rate and regular rhythm.      Pulses: Normal pulses.      Heart sounds: Normal heart sounds.   Pulmonary:      Effort: Pulmonary effort is normal.      Breath sounds: Normal breath sounds.   Abdominal:      General: Bowel sounds are normal.      Palpations: Abdomen is soft.   Musculoskeletal:         General: Normal range of motion.      Comments: Large swollen bursa r elbow fluid leon, no eyrthema or skin break or knonw truam,ea and good flexion extention rom   Skin:     General: Skin is warm and dry.   Neurological:      General: No focal deficit present.      Mental Status: He is alert and oriented to person, place, and time. Mental status is at baseline.     Patient ID: Sarthak Portillo is a 70 y.o. male.    Joint Injection Medium/Arthrocentesis: R olecranon bursa on 3/28/2025 12:58 PM  Indications: joint swelling and diagnostic evaluation  Details: 18 G needle, anterior approach  Aspirate: 25 mL blood-tinged  Outcome: tolerated well, no immediate complications  Procedure, treatment  alternatives, risks and benefits explained, specific risks discussed. Consent was given by the patient. Immediately prior to procedure a time out was called to verify the correct patient, procedure, equipment, support staff and site/side marked as required. Patient was prepped and draped in the usual sterile fashion.           Assessment/Plan   Problem List Items Addressed This Visit    None  R olecrenon bursa swelling drained 25 ml serosanquinous fluid no puss or evidence fo inf, pt tolerated prceedure well w no issues and normal archetecture of skin achieved and then area pressure dressed which he should keep for 72 hours to reduce likelhood or recurrence, can loosen dressing if tight, call if swelling, pain , erythema or fever and fu

## 2025-03-31 ENCOUNTER — OFFICE VISIT (OUTPATIENT)
Dept: UROLOGY | Facility: CLINIC | Age: 70
End: 2025-03-31
Payer: MEDICARE

## 2025-03-31 VITALS — TEMPERATURE: 97.7 F

## 2025-03-31 DIAGNOSIS — N40.1 BPH WITH OBSTRUCTION/LOWER URINARY TRACT SYMPTOMS: ICD-10-CM

## 2025-03-31 DIAGNOSIS — N13.8 BPH WITH OBSTRUCTION/LOWER URINARY TRACT SYMPTOMS: ICD-10-CM

## 2025-03-31 DIAGNOSIS — N32.81 OVERACTIVE BLADDER: ICD-10-CM

## 2025-03-31 DIAGNOSIS — N52.8 OTHER MALE ERECTILE DYSFUNCTION: Primary | ICD-10-CM

## 2025-03-31 DIAGNOSIS — C64.9 RENAL CELL CARCINOMA, UNSPECIFIED LATERALITY: ICD-10-CM

## 2025-03-31 LAB
POC APPEARANCE, URINE: CLEAR
POC BILIRUBIN, URINE: NEGATIVE
POC BLOOD, URINE: ABNORMAL
POC COLOR, URINE: YELLOW
POC GLUCOSE, URINE: ABNORMAL MG/DL
POC KETONES, URINE: NEGATIVE MG/DL
POC LEUKOCYTES, URINE: NEGATIVE
POC NITRITE,URINE: NEGATIVE
POC PH, URINE: 5.5 PH
POC PROTEIN, URINE: NEGATIVE MG/DL
POC SPECIFIC GRAVITY, URINE: 1.01
POC UROBILINOGEN, URINE: 0.2 EU/DL

## 2025-03-31 PROCEDURE — 81003 URINALYSIS AUTO W/O SCOPE: CPT | Performed by: STUDENT IN AN ORGANIZED HEALTH CARE EDUCATION/TRAINING PROGRAM

## 2025-03-31 PROCEDURE — 1126F AMNT PAIN NOTED NONE PRSNT: CPT | Performed by: STUDENT IN AN ORGANIZED HEALTH CARE EDUCATION/TRAINING PROGRAM

## 2025-03-31 PROCEDURE — 1160F RVW MEDS BY RX/DR IN RCRD: CPT | Performed by: STUDENT IN AN ORGANIZED HEALTH CARE EDUCATION/TRAINING PROGRAM

## 2025-03-31 PROCEDURE — 99214 OFFICE O/P EST MOD 30 MIN: CPT | Performed by: STUDENT IN AN ORGANIZED HEALTH CARE EDUCATION/TRAINING PROGRAM

## 2025-03-31 PROCEDURE — 1159F MED LIST DOCD IN RCRD: CPT | Performed by: STUDENT IN AN ORGANIZED HEALTH CARE EDUCATION/TRAINING PROGRAM

## 2025-03-31 RX ORDER — TROSPIUM CHLORIDE ER 60 MG/1
60 CAPSULE ORAL DAILY
Qty: 90 CAPSULE | Refills: 3 | Status: SHIPPED | OUTPATIENT
Start: 2025-03-31

## 2025-03-31 RX ORDER — TAMSULOSIN HYDROCHLORIDE 0.4 MG/1
0.4 CAPSULE ORAL DAILY
Qty: 90 CAPSULE | Refills: 3 | Status: SHIPPED | OUTPATIENT
Start: 2025-03-31

## 2025-03-31 ASSESSMENT — PAIN SCALES - GENERAL: PAINLEVEL_OUTOF10: 0-NO PAIN

## 2025-03-31 NOTE — PROGRESS NOTES
Scribed for Dr. Brennan Barkley by Hansel Bishop. I, Dr. Brennan Barkley have personally reviewed and agreed with the information entered by the Virtual Scribe. 03/31/25.    ASSESSMENT:  Problem List Items Addressed This Visit       BPH with obstruction/lower urinary tract symptoms    Relevant Medications    tamsulosin (Flomax) 0.4 mg 24 hr capsule    trospium (Sanctura XR) 60 mg 24 hour capsule    Erectile dysfunction - Primary    Relevant Orders    Testosterone (Completed)     Other Visit Diagnoses       Renal cell carcinoma, unspecified laterality (Multi)        Relevant Orders    POCT UA Automated manually resulted (Completed)    Measure post void residual (Completed)    Overactive bladder        Relevant Medications    trospium (Sanctura XR) 60 mg 24 hour capsule          BPH with LUTS - s/p HoLEP (2017) w/ Dr. Martinez, continues on tamsulosin.  Renal Cyst, LEFT  Hx of RCC - s/p biopsy/cryoablation (2005)  Erectile dysfunction - follows up with Dr. Israel, on 100mg viagra PRN.  PSA screening - last 1.43 (Sept 2023)  Hx of microhematuria - s/p negative work-up  History hip replacement with prosthesis - c/b infection/abscess, since resolved.    PLAN:  #BPH with LUTS  Currently he is not at treatment goal.   Denotes benefit continuing tamsulosin for his BPH.   Tolerating the medication without side-effects.   Discussed risks of continued use and alternative treatment options.   Patient elects to continue tamsulosin, Rx refill sent to pharmacy.   Will reevaluate plan on an annual basis.     #OAB symptoms.   Patient failed oxybutynin/Trospium therapy. Recommended he see Dr. Ann to consider repeat work-up +/- outlet procedure. Referral provided; if found to be unnecessary, maybe he would benefit from referral to Dr. Perales to discuss neuromodulation for his OAB. For now, elects to continue Trospium therapy for his OAB. Tolerating the medication without side-effects. Discussed risks of continued use and alternative  treatment options.   Rx refill sent to pharmacy.     #Renal cyst, left ~ Bosniak 2F  Continue annual renal surveillance.   Repeat a renal ultrasound in one year.     Renal ultrasound (01/28/25) personally reviewed and independently interpreted.  Possible slight enlargement of the left Bosniak 2F cyst.     #Erectile dysfunction  Failed PDE5i in the past.   Elects to repeat a testosterone.   Review results next visit and revisit options including referral for TRT.   Testosterone 273 (2021)    Discussion:  Patient was seen today with the complaint of erectile dysfunction (ED). I went over the definition of ED, which is the inability to obtain or maintain an erection sufficient for satisfactory sexual activity. I outlined that erectile function is a complex interplay of neural vascular, hormonal and psychological factors. Disruption in any of these pathways may lead to ED. In terms of treatment options; PDE5i (Viagra, Cialis, etc.), intracavernosal injections (ICI), vacuum erection devices (WAYNE), and penile implants (IPP) were discussed in detail.     #Prostate cancer screening  May discontinue further PSA surveillance given his advanced age.     PSA summary:  1.69 ~ Jan 2025  1.43 ~ Sept 2023  1.23 ~ Nov 2022    All questions were answered to the patient’s satisfaction.  Patient agrees with the plan and wishes to proceed.  Continue follow-up for ongoing care of his chronic medical conditions.       History of Present Illness (HPI):  Sarthak presents for annual follow up.  The patient’s EMR has been reviewed.  Lives in [place], [place].  Previously seen by Dr. Elizalde, Dr. Martinez and Dr. Israel.     History of BPH with LUTS, s/p HoLEP (2017) w/ Dr. Martinez, and OAB symptoms, continues on tamsulosin and Trospium. Hx of RCC s/p biopsy and cryoablation (2005); renal cyst (LEFT), and erectile dysfunction. Previously followed and treated by Dr. Israel.    TODAY: (03/31/25)  Presents for annual follow up and  ultrasound results.   Reports multiple chronic symptoms today.   Stream remains somewhat weak, feels he empties well.   Primarily c/o frequency, urgency, and nocturia; NTF q1h.  As well as occasional post-void dribbling.   Denies any UTI's or gross hematuria in the interim.   Continues to take Miralax for chronic constipation.   Denies any benefit of his urinary symptoms s/p a BM.     Reports chronic hx of ED.   Minimal to no benefit with PDE5i in the past.   Expressed interest in repeating his testosterone and potential TRT.     Uroflow results:   interrupted and prolonged curve curve  Qmax: 13 mL/s  VV: 147 mL  PVR: 98 mL (abnormal)    CT pelvis wo IV contrast (03/04/25) personally reviewed and independently interpreted.  Significant prostatomegaly without definite Holep defect seen; possibly had unilateral procedure.     Renal ultrasound (01/28/25) personally reviewed and independently interpreted.  Possible slight enlargement of the left Bosniak 2F cyst.     TO REVIEW: Last visit (03/25/24)  Last visit, provided trial of trospium.   Reports some benefit with this.  Denotes improvement of his urgency primarily.   As well as some improvement of his post-void dribbling.  Albeit, reports some slowing of his stream.  Otherwise, no significant side-effects.   C/o persistent frequency as well during the day.   Denies any recent infections, gross hematuria, fevers or chills.   Latest PSA 1.43 (Sept 2023).    TO REVIEW: Initial visit (02/13/23)  Reports persistent urinary frequency q1-1.5h.  As well as persistent post-void dribbling.   Denies any gross hematuria, or UTIs. Acknowledges being treated with tamsulosin in the past as well as oxybutynin. Notes that he discontinued oxybutynin use 2/2 developing headaches however he does state that this helped with is post-void dribbling and frequency in the past. He would like to try an alternative medication to oxybutynin today.  mL.      Personally reviewed the  patient's labs and imaging, his last CT chest abd pelvis without contrast was (April 2022) which demonstrated a calcified cystic mass upper pole left kidney. Appearance is unchanged. Tiny bilateral nonobstructive renal calculi. Prostate gland enlargement.     NIDDM ; POC Hgb A1c 8.2% (Sept 2023).  Splenic artery aneurysm - declined pursuing in the past.    PMH: BPH, OAB,       Past Medical History:   Diagnosis Date    Chronic obstructive pulmonary disease, unspecified 11/21/2022    COPD (chronic obstructive pulmonary disease)    Sleep apnea, unspecified 01/29/2021    Sleep apnea     Past Surgical History:   Procedure Laterality Date    APPENDECTOMY  04/04/2013    Appendectomy    CT HEAD ANGIO W AND WO IV CONTRAST  11/28/2018    CT HEAD ANGIO W AND WO IV CONTRAST 11/28/2018 [facility]    KNEE SURGERY  03/14/2020    Knee Surgery    OTHER SURGICAL HISTORY  04/04/2013    Kidney Surgery Laparoscopic Ablation Of Renal Mass Lesion(S)    OTHER SURGICAL HISTORY  04/04/2013    Uvulectomy    OTHER SURGICAL HISTORY  03/14/2020    Back surgery    OTHER SURGICAL HISTORY  03/14/2020    Hip replacement    OTHER SURGICAL HISTORY  03/14/2020    Spinal Neurostimulator Pulse Generator    TONSILLECTOMY  04/04/2013    Tonsillectomy    UMBILICAL HERNIA REPAIR  04/04/2013    Umbilical Hernia Repair    US GUIDED ABSCESS FLUID COLLECTION DRAINAGE  8/19/2020    US GUIDED ABSCESS FLUID COLLECTION DRAINAGE 8/19/2020 [facility]     Family History   Problem Relation Name Age of Onset    Pancreatic cancer Mother       Social History     Tobacco Use   Smoking Status Former    Current packs/day: 1.00    Types: Cigarettes   Smokeless Tobacco Never     Current Outpatient Medications   Medication Sig Dispense Refill    albuterol (Ventolin HFA) 90 mcg/actuation inhaler Inhale 2 puffs. 4-6 hrs prn      albuterol 2.5 mg /3 mL (0.083 %) nebulizer solution USE 1 UNIT DOSE EVERY 4-6 HOURS AS NEEDED FOR WHEEZING .       amLODIPine-benazepriL (LotreL) 5-20 mg capsule Take 1 capsule by mouth once daily. 100 capsule 3    amoxicillin (Amoxil) 875 mg tablet Take 1 tablet (875 mg) by mouth 2 times daily (morning and late afternoon). 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      doxycycline (Periostat) 20 mg tablet Take 1 tablet (20 mg) by mouth. WITH FOOD AND WATER      DULoxetine (Cymbalta) 60 mg DR capsule TAKE 1 CAPSULE BY MOUTH EVERY DAY 90 capsule 0    empagliflozin (Jardiance) 25 mg Take 1 tablet (25 mg) by mouth once daily. 90 tablet 1    furosemide (Lasix) 40 mg tablet TAKE 1 TABLET BY MOUTH EVERY DAY 90 tablet 0    gabapentin (Neurontin) 300 mg capsule Take 1 capsule (300 mg) by mouth 2 times a day. 180 capsule 1    glipiZIDE XL (Glucotrol XL) 10 mg 24 hr tablet Take 1 tablet (10 mg) by mouth once daily. Do not crush, chew, or split. 90 tablet 1    ipratropium (Atrovent) 21 mcg (0.03 %) nasal spray ADMINISTER 2 SPRAYS INTO EACH NOSTRIL EVERY 12 HOURS. 30 mL 1    metroNIDAZOLE (Metrogel) 0.75 % gel Apply topically 2 times a day.      mometasone-formoterol (Dulera) 200-5 mcg/actuation inhaler Inhale 2 puffs 2 times a day. 13 g 2    omeprazole (PriLOSEC) 20 mg DR capsule TAKE 1 CAPSULE BY MOUTH EVERY DAY 90 capsule 0    rosuvastatin (Crestor) 20 mg tablet TAKE 1 TABLET BY MOUTH EVERY DAY 90 tablet 0    tamsulosin (Flomax) 0.4 mg 24 hr capsule Take 1 capsule (0.4 mg) by mouth once daily. 90 capsule 3    triamcinolone (Nasacort) 55 mcg nasal inhaler Administer 2 sprays into each nostril once daily. 16.5 g 3    trospium (Sanctura XR) 60 mg 24 hour capsule Take 1 capsule (60 mg) by mouth once daily. 90 capsule 3     No current facility-administered medications for this visit.     Allergies   Allergen Reactions    Adhesive Tape-Silicones Unknown    Iodine Other    Tramadol Hives and Rash     Past medical,  surgical, family and social history in the chart was reviewed and is accurate including any additions to what is in this HPI.    REVIEW OF SYSTEMS (ROS):   Constitutional: denies any unintentional weight loss or change in strength.  Integumentary: denies any rashes or pruritus.  Eyes: denies any double vision or eye pain.  Ear/Nose/Mouth/Throat: denies any nosebleeds or gum bleeds.  Cardiovascular: denies any chest pain or syncope.  Respiratory: denies hemoptysis.  Gastrointestinal: denies nausea or vomiting.  Musculoskeletal: denies muscle cramping or weakness.  Neurologic: denies convulsions or seizures.  Hematologic/Lymphatic: denies bleeding tendencies.  Endocrine: denies heat/cold intolerance.  All other systems have been reviewed and are negative unless otherwise noted in the HPI.     OBJECTIVE:  Visit Vitals  Temp 36.5 °C (97.7 °F)     PHYSICAL EXAM:  Constitutional: No obvious distress.  Eyes: Non-injected conjunctiva, sclera clear, EOMI.  Ears/Nose/Mouth/Throat: No obvious drainage per ears or nose.  Cardiovascular: Extremities are warm and well perfused. No edema, cyanosis or pallor.  Respiratory: No audible wheezing/stridor; respirations do not appear labored.  Gastrointestinal: Abdomen soft, not distended.  Musculoskeletal: Normal ROM of extremities.  Skin: No obvious rashes or open sores.  Neurologic: Alert and oriented, CN 2-12 grossly intact.  Psychiatric: Answers questions appropriately with normal affect.  Hematologic/Lymphatic/Immunologic: No obvious bruises or sites of spontaneous bleeding.  Genitourinary: No CVA tenderness, bladder not palpable.     LABS & IMAGING:  Lab Results   Component Value Date    WBC 6.0 09/01/2023    HGB 15.8 09/01/2023    HCT 49.0 09/01/2023     09/01/2023    CHOL 159 10/07/2024    TRIG 355 (H) 10/07/2024    HDL 33.0 10/07/2024    ALT 35 10/07/2024    AST 19 10/07/2024     10/07/2024    K 4.1 10/07/2024    CL 98 10/07/2024    CREATININE 0.85 10/07/2024     BUN 20 10/07/2024    CO2 24 10/07/2024    TSH 1.03 01/29/2021    PSA 1.69 01/07/2025    INR 1.1 08/18/2020    HGBA1C 8.4 (A) 02/24/2025     Scribed for Dr. Brennan Barkley by Hansel Bishop.  I, Dr. Brennan Barkley have personally reviewed and agreed with the information entered by the Virtual Scribe. 03/31/25.

## 2025-04-01 ENCOUNTER — APPOINTMENT (OUTPATIENT)
Dept: UROLOGY | Facility: CLINIC | Age: 70
End: 2025-04-01
Payer: MEDICARE

## 2025-04-01 LAB — TESTOST SERPL-MCNC: 109 NG/DL (ref 250–827)

## 2025-04-07 ENCOUNTER — TELEPHONE (OUTPATIENT)
Dept: PRIMARY CARE | Facility: CLINIC | Age: 70
End: 2025-04-07
Payer: MEDICARE

## 2025-04-07 NOTE — TELEPHONE ENCOUNTER
Patient states the fluid you took out of his elbow has returned. Patient wants to know what you suggest he do.

## 2025-04-07 NOTE — PROGRESS NOTES
Urology    Dr MARIA ESTHER Bertrand                  [phone]  Dr Garcia             [phone]  Dr Hinkle             [phone]  Dr Mcgee               [phone]

## 2025-04-14 DIAGNOSIS — M17.10 UNILATERAL PRIMARY OSTEOARTHRITIS, UNSPECIFIED KNEE: ICD-10-CM

## 2025-04-14 DIAGNOSIS — N52.9 MALE ERECTILE DYSFUNCTION, UNSPECIFIED: ICD-10-CM

## 2025-04-14 DIAGNOSIS — E11.69 TYPE 2 DIABETES MELLITUS WITH OTHER SPECIFIED COMPLICATION, UNSPECIFIED WHETHER LONG TERM INSULIN USE (MULTI): ICD-10-CM

## 2025-04-14 RX ORDER — GABAPENTIN 300 MG/1
300 CAPSULE ORAL 2 TIMES DAILY
Qty: 180 CAPSULE | Refills: 1 | Status: SHIPPED | OUTPATIENT
Start: 2025-04-14

## 2025-04-14 RX ORDER — DULOXETIN HYDROCHLORIDE 60 MG/1
60 CAPSULE, DELAYED RELEASE ORAL DAILY
Qty: 90 CAPSULE | Refills: 0 | Status: SHIPPED | OUTPATIENT
Start: 2025-04-14

## 2025-04-14 RX ORDER — EMPAGLIFLOZIN 10 MG/1
10 TABLET, FILM COATED ORAL DAILY
Qty: 90 TABLET | Refills: 1 | Status: SHIPPED | OUTPATIENT
Start: 2025-04-14

## 2025-04-14 NOTE — PROGRESS NOTES
HPI    70 y.o. male being seen with the following problem list:    Problem list:  BPH, LUTS - On Flomax and Sanctura. s/p HoLEP (2017) w/ Dr. Martinez, continues on tamsulosin.   ED - follows up with Dr. Israel, on 100mg viagra PRN     3/4/25 CT pelvis  - Fully distended urinary bladder. Correlation with urinary outlet  obstruction recommended.  - Prostatomegaly with lobulated contours.    04/16/25 - PVR 0cc.  Weak stream, straining, hesitancy, frequency, urgency, on Flomax and trospium. No infection, has hematuria here and there. Concerned about low T, has low libido, low energy.       Lab Results   Component Value Date    PSA 1.69 01/07/2025    PSA 1.43 09/01/2023    PSA 1.23 11/15/2022    PSA 1.86 10/04/2021              Current Medications:  Current Outpatient Medications   Medication Sig Dispense Refill    albuterol (Ventolin HFA) 90 mcg/actuation inhaler Inhale 2 puffs. 4-6 hrs prn      albuterol 2.5 mg /3 mL (0.083 %) nebulizer solution USE 1 UNIT DOSE EVERY 4-6 HOURS AS NEEDED FOR WHEEZING .      amLODIPine-benazepriL (LotreL) 5-20 mg capsule Take 1 capsule by mouth once daily. 100 capsule 3    amoxicillin (Amoxil) 875 mg tablet Take 1 tablet (875 mg) by mouth 2 times daily (morning and late afternoon). 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      doxycycline (Periostat) 20 mg tablet Take 1 tablet (20 mg) by mouth. WITH FOOD AND WATER      DULoxetine (Cymbalta) 60 mg DR capsule TAKE 1 CAPSULE BY MOUTH EVERY DAY 90 capsule 0    empagliflozin (Jardiance) 25 mg Take 1 tablet (25 mg) by mouth once daily. 90 tablet 1    furosemide (Lasix) 40 mg tablet TAKE 1 TABLET BY MOUTH EVERY DAY 90 tablet 0    gabapentin (Neurontin) 300 mg capsule TAKE 1 CAPSULE BY MOUTH TWICE A  capsule 1    glipiZIDE XL (Glucotrol XL) 10 mg 24 hr tablet Take 1 tablet (10 mg) by mouth once daily.  Do not crush, chew, or split. 90 tablet 1    ipratropium (Atrovent) 21 mcg (0.03 %) nasal spray ADMINISTER 2 SPRAYS INTO EACH NOSTRIL EVERY 12 HOURS. 30 mL 1    Jardiance 10 mg tablet TAKE 1 TABLET BY MOUTH EVERY DAY 90 tablet 1    metroNIDAZOLE (Metrogel) 0.75 % gel Apply topically 2 times a day.      mometasone-formoterol (Dulera) 200-5 mcg/actuation inhaler Inhale 2 puffs 2 times a day. 13 g 2    omeprazole (PriLOSEC) 20 mg DR capsule TAKE 1 CAPSULE BY MOUTH EVERY DAY 90 capsule 0    rosuvastatin (Crestor) 20 mg tablet TAKE 1 TABLET BY MOUTH EVERY DAY 90 tablet 0    tamsulosin (Flomax) 0.4 mg 24 hr capsule Take 1 capsule (0.4 mg) by mouth once daily. 90 capsule 3    triamcinolone (Nasacort) 55 mcg nasal inhaler Administer 2 sprays into each nostril once daily. 16.5 g 3    trospium (Sanctura XR) 60 mg 24 hour capsule Take 1 capsule (60 mg) by mouth once daily. 90 capsule 3     No current facility-administered medications for this visit.        Active Problems:  Sarthak Portillo is a 70 y.o. male with the following Problems and Medications.  Patient Active Problem List   Diagnosis    Abnormal finding in urine    Abnormal glucose    Acute serous otitis media    Adenomatous polyps    Anemia    Benign neoplasm of parotid gland    Parotid neoplasm    Bilateral carpal tunnel syndrome    Blood in semen    BPH with obstruction/lower urinary tract symptoms    C. difficile colitis    CAP (community acquired pneumonia)    Chronic back pain    Chronic pain    Chronic pain of both shoulders    Chronic seromucinous otitis media, left    Complications of internal orthopedic device, implant, and graft    COPD (chronic obstructive pulmonary disease) (Multi)    Depression    Dyspnea on exertion    Elevated prostate specific antigen (PSA)    Enterococcus faecalis infection    Erectile dysfunction    Organic impotence    Esophageal reflux    Eustachian tube dysfunction    Hematuria    History of kidney cancer    Hyperglycemia     Hyperlipidemia    Hypertension    Hypogonadism male    Arthralgia    Chronic pain of right hip    Knee pain    Thigh pain    Left shoulder pain    Loose orthopedic implant    Low testosterone    Neck pain    Nephrolithiasis    Renal cyst, left    Other disorders of intestinal carbohydrate absorption    Paresthesia    Primary localized osteoarthrosis of right lower leg    Primary osteoarthritis of left knee    Infection of right prosthetic hip joint    Salivary secretion disturbance    Sensorineural hearing loss, bilateral    Sleep apnea    Splenic artery aneurysm (CMS-HCC)    Strain of musc/tend the rotator cuff of right shoulder, init    Subjective tinnitus of both ears    Thoracic spine pain    Trapezius muscle spasm    Tubular adenoma    Type 2 diabetes mellitus    Vitamin D deficiency    Long term (current) use of antibiotics    Hemangioma of skin and subcutaneous tissue    Melanocytic nevi of trunk    Melanocytic nevi of unspecified lower limb, including hip    Melanocytic nevi of unspecified upper limb, including shoulder    Melanocytic nevi of other parts of face    Neoplasm of uncertain behavior of skin    Rosacea, unspecified    Actinic keratosis    Erythema intertrigo    Other seborrheic keratosis    Skin changes due to chronic exposure to nonionizing radiation, unspecified    Rhinophyma    Scar condition and fibrosis of skin    Other melanin hyperpigmentation    Non-melanoma skin cancer    Carpal tunnel syndrome    Deep incisional surgical site infection    Subjective tinnitus    Methicillin resistant Staphylococcus aureus infection     Current Outpatient Medications   Medication Sig Dispense Refill    albuterol (Ventolin HFA) 90 mcg/actuation inhaler Inhale 2 puffs. 4-6 hrs prn      albuterol 2.5 mg /3 mL (0.083 %) nebulizer solution USE 1 UNIT DOSE EVERY 4-6 HOURS AS NEEDED FOR WHEEZING .      amLODIPine-benazepriL (LotreL) 5-20 mg capsule Take 1 capsule by mouth once daily. 100 capsule 3    amoxicillin  (Amoxil) 875 mg tablet Take 1 tablet (875 mg) by mouth 2 times daily (morning and late afternoon). 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      doxycycline (Periostat) 20 mg tablet Take 1 tablet (20 mg) by mouth. WITH FOOD AND WATER      DULoxetine (Cymbalta) 60 mg DR capsule TAKE 1 CAPSULE BY MOUTH EVERY DAY 90 capsule 0    empagliflozin (Jardiance) 25 mg Take 1 tablet (25 mg) by mouth once daily. 90 tablet 1    furosemide (Lasix) 40 mg tablet TAKE 1 TABLET BY MOUTH EVERY DAY 90 tablet 0    gabapentin (Neurontin) 300 mg capsule TAKE 1 CAPSULE BY MOUTH TWICE A  capsule 1    glipiZIDE XL (Glucotrol XL) 10 mg 24 hr tablet Take 1 tablet (10 mg) by mouth once daily. Do not crush, chew, or split. 90 tablet 1    ipratropium (Atrovent) 21 mcg (0.03 %) nasal spray ADMINISTER 2 SPRAYS INTO EACH NOSTRIL EVERY 12 HOURS. 30 mL 1    Jardiance 10 mg tablet TAKE 1 TABLET BY MOUTH EVERY DAY 90 tablet 1    metroNIDAZOLE (Metrogel) 0.75 % gel Apply topically 2 times a day.      mometasone-formoterol (Dulera) 200-5 mcg/actuation inhaler Inhale 2 puffs 2 times a day. 13 g 2    omeprazole (PriLOSEC) 20 mg DR capsule TAKE 1 CAPSULE BY MOUTH EVERY DAY 90 capsule 0    rosuvastatin (Crestor) 20 mg tablet TAKE 1 TABLET BY MOUTH EVERY DAY 90 tablet 0    tamsulosin (Flomax) 0.4 mg 24 hr capsule Take 1 capsule (0.4 mg) by mouth once daily. 90 capsule 3    triamcinolone (Nasacort) 55 mcg nasal inhaler Administer 2 sprays into each nostril once daily. 16.5 g 3    trospium (Sanctura XR) 60 mg 24 hour capsule Take 1 capsule (60 mg) by mouth once daily. 90 capsule 3     No current facility-administered medications for this visit.       PMH:  Past Medical History:   Diagnosis Date    Chronic obstructive pulmonary disease, unspecified 11/21/2022    COPD (chronic obstructive pulmonary disease)    Sleep apnea,  unspecified 2021    Sleep apnea       PSH:  Past Surgical History:   Procedure Laterality Date    APPENDECTOMY  2013    Appendectomy    CT HEAD ANGIO W AND WO IV CONTRAST  2018    CT HEAD ANGIO W AND WO IV CONTRAST 2018 [facility]    KNEE SURGERY  2020    Knee Surgery    OTHER SURGICAL HISTORY  2013    Kidney Surgery Laparoscopic Ablation Of Renal Mass Lesion(S)    OTHER SURGICAL HISTORY  2013    Uvulectomy    OTHER SURGICAL HISTORY  2020    Back surgery    OTHER SURGICAL HISTORY  2020    Hip replacement    OTHER SURGICAL HISTORY  2020    Spinal Neurostimulator Pulse Generator    TONSILLECTOMY  2013    Tonsillectomy    UMBILICAL HERNIA REPAIR  2013    Umbilical Hernia Repair    US GUIDED ABSCESS FLUID COLLECTION DRAINAGE  2020    US GUIDED ABSCESS FLUID COLLECTION DRAINAGE 2020 [facility]       FMH:  Family History   Problem Relation Name Age of Onset    Pancreatic cancer Mother         SHx:  Social History     Tobacco Use    Smoking status: Former     Current packs/day: 0.00     Types: Cigarettes     Quit date: 2025     Years since quittin.2    Smokeless tobacco: Never       Allergies:  Allergies   Allergen Reactions    Adhesive Tape-Silicones Unknown    Iodine Other    Tramadol Hives and Rash       Assessment/Plan  BPH 140g, LUTS, mostly obstructive and irritative, s/p HoLE in 2017. I recommend bringing him in for a cysto to evaluate his channel. I suspect he may have a regrowth but will assess during cysto. In the meantime, will schedule him for surgery.     Symptoms of hypogonadism; low energy, low libido, will check his T level in 2 weeks.     FU with cysto.     [employer] Attestation  By signing my name below, I, Michael Rosen, attest that this documentation has been prepared under the direction and in the presence of Ascencion Ann MD.

## 2025-04-16 ENCOUNTER — APPOINTMENT (OUTPATIENT)
Dept: UROLOGY | Facility: HOSPITAL | Age: 70
End: 2025-04-16
Payer: MEDICARE

## 2025-04-16 DIAGNOSIS — R53.83 LOW ENERGY: ICD-10-CM

## 2025-04-16 DIAGNOSIS — N13.8 BPH WITH OBSTRUCTION/LOWER URINARY TRACT SYMPTOMS: ICD-10-CM

## 2025-04-16 DIAGNOSIS — N40.1 BPH WITH OBSTRUCTION/LOWER URINARY TRACT SYMPTOMS: ICD-10-CM

## 2025-04-16 DIAGNOSIS — N32.81 OVERACTIVE BLADDER: Primary | ICD-10-CM

## 2025-04-16 PROCEDURE — 1159F MED LIST DOCD IN RCRD: CPT | Performed by: UROLOGY

## 2025-04-16 PROCEDURE — G2211 COMPLEX E/M VISIT ADD ON: HCPCS | Performed by: UROLOGY

## 2025-04-16 PROCEDURE — 99214 OFFICE O/P EST MOD 30 MIN: CPT | Performed by: UROLOGY

## 2025-04-16 PROCEDURE — 3052F HG A1C>EQUAL 8.0%<EQUAL 9.0%: CPT | Performed by: UROLOGY

## 2025-04-17 DIAGNOSIS — J30.9 ALLERGIC RHINITIS, UNSPECIFIED SEASONALITY, UNSPECIFIED TRIGGER: ICD-10-CM

## 2025-04-17 RX ORDER — IPRATROPIUM BROMIDE 21 UG/1
2 SPRAY, METERED NASAL EVERY 12 HOURS
Qty: 30 ML | Refills: 1 | Status: SHIPPED | OUTPATIENT
Start: 2025-04-17 | End: 2026-04-17

## 2025-04-18 DIAGNOSIS — R53.83 LOW ENERGY: ICD-10-CM

## 2025-04-21 ENCOUNTER — TELEPHONE (OUTPATIENT)
Dept: PRIMARY CARE | Facility: CLINIC | Age: 70
End: 2025-04-21
Payer: MEDICARE

## 2025-04-21 DIAGNOSIS — E11.69 TYPE 2 DIABETES MELLITUS WITH OTHER SPECIFIED COMPLICATION, UNSPECIFIED WHETHER LONG TERM INSULIN USE (MULTI): ICD-10-CM

## 2025-04-21 NOTE — TELEPHONE ENCOUNTER
Wife is calling.   State n January jardiance was uped to 25 mg but the script that was just called in was for 10 mg.     Just wants to check with dose patient should be on.

## 2025-04-24 ENCOUNTER — APPOINTMENT (OUTPATIENT)
Dept: UROLOGY | Facility: HOSPITAL | Age: 70
End: 2025-04-24
Payer: MEDICARE

## 2025-05-02 LAB — TESTOST SERPL-MCNC: 93 NG/DL (ref 250–827)

## 2025-05-04 NOTE — TELEPHONE ENCOUNTER
----- Message from Cory KELLER sent at 10/8/2024  8:17 AM EDT -----      ----- Message -----  From: Ayo Zuniga MD  Sent: 10/8/2024   7:55 AM EDT  To: Willow Chandler; Cory Roche MA    All results are stable  no change  
----- Message from Cory KELLER sent at 10/8/2024  8:17 AM EDT -----      ----- Message -----  From: Ayo Zuniga MD  Sent: 10/8/2024   7:55 AM EDT  To: Willow Chandler; Cory Roche MA    All results are stable  no change  
Spoke with patient.  
Spoke with patient.  
pt presents with difficulty breathing x2d. fever x2 day. motrin given @11pm. albuterol @0000. insp and exp wheezes upon ausculation. pt awake and alert. [id number]  denies pmhx, iutd, nkda

## 2025-05-15 DIAGNOSIS — J30.9 ALLERGIC RHINITIS, UNSPECIFIED SEASONALITY, UNSPECIFIED TRIGGER: ICD-10-CM

## 2025-05-15 RX ORDER — IPRATROPIUM BROMIDE 21 UG/1
2 SPRAY, METERED NASAL EVERY 12 HOURS
Qty: 30 ML | Refills: 1 | Status: SHIPPED | OUTPATIENT
Start: 2025-05-15 | End: 2026-05-15

## 2025-05-22 ENCOUNTER — TELEMEDICINE (OUTPATIENT)
Dept: UROLOGY | Facility: HOSPITAL | Age: 70
End: 2025-05-22
Payer: MEDICARE

## 2025-05-22 DIAGNOSIS — R53.83 LOW ENERGY: ICD-10-CM

## 2025-05-22 DIAGNOSIS — R79.89 LOW TESTOSTERONE: Primary | ICD-10-CM

## 2025-05-22 PROCEDURE — G2211 COMPLEX E/M VISIT ADD ON: HCPCS | Performed by: UROLOGY

## 2025-05-22 PROCEDURE — 3052F HG A1C>EQUAL 8.0%<EQUAL 9.0%: CPT | Performed by: UROLOGY

## 2025-05-22 PROCEDURE — 99214 OFFICE O/P EST MOD 30 MIN: CPT | Performed by: UROLOGY

## 2025-05-23 LAB
ESTRADIOL SERPL-MCNC: 27 PG/ML
FSH SERPL-ACNC: 9.5 MIU/ML (ref 1.4–12.8)
LH SERPL-ACNC: 8.4 MIU/ML (ref 1.6–15.2)
PROLACTIN SERPL-MCNC: 6.3 NG/ML (ref 2–18)

## 2025-05-28 NOTE — PROGRESS NOTES
HPI    70 y.o. male being seen with the following problem list:    Problem list:  BPH, LUTS - On Flomax and Sanctura. s/p HoLEP (2017) w/ Dr. Martinez, continues on tamsulosin.   ED - follows up with Dr. Israel, on 100mg viagra PRN      3/4/25 CT pelvis  - Fully distended urinary bladder. Correlation with urinary outlet  obstruction recommended.  - Prostatomegaly with lobulated contours.     04/16/25 - PVR 0cc.  Weak stream, straining, hesitancy, frequency, urgency, on Flomax and trospium. No infection, has hematuria here and there. Concerned about low T, has low libido, low energy.      05/21/25 - testosterone remains low. 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       Lab Results   Component Value Date    PSA 1.69 01/07/2025    PSA 1.43 09/01/2023    PSA 1.23 11/15/2022    PSA 1.86 10/04/2021              Current Medications:  Current Medications[1]     Active Problems:  Sarthak Portillo is a 70 y.o. male with the following Problems and Medications.  Problem List[2]  Current Medications[3]    PMH:  Medical History[4]    PSH:  Surgical History[5]    FMH:  Family History[6]    SHx:  Social History[7]    Allergies:  RX Allergies[8]      Assessment/Plan  Reviewed labs consistent with hypogonadotropic hypogonadism. Will start him on T cypionate 100mg IM weekly. Reviewed side effects. Reviewed AUA guideline on counseling prior to TRT.    FU in 3 months with T, Hematrt and PSA, mid cycle.     Scribe Attestation  By signing my name below, I, Michael Rosen, attest that this documentation has been prepared under the direction and in the presence of Ascencion Ann MD.           [1]   Current Outpatient Medications   Medication Sig Dispense Refill    albuterol (Ventolin HFA) 90 mcg/actuation inhaler Inhale 2 puffs. 4-6 hrs prn      albuterol 2.5 mg /3 mL  (0.083 %) nebulizer solution USE 1 UNIT DOSE EVERY 4-6 HOURS AS NEEDED FOR WHEEZING .      amLODIPine-benazepriL (LotreL) 5-20 mg capsule Take 1 capsule by mouth once daily. 100 capsule 3    cetirizine (ZyrTEC) 5 mg tablet Take 1 tablet (5 mg) by mouth once daily in the evening.      doxycycline (Periostat) 20 mg tablet Take 1 tablet (20 mg) by mouth. WITH FOOD AND WATER      DULoxetine (Cymbalta) 60 mg DR capsule TAKE 1 CAPSULE BY MOUTH EVERY DAY 90 capsule 0    empagliflozin (Jardiance) 25 mg tablet Take 1 tablet (25 mg) by mouth once daily. 90 tablet 1    furosemide (Lasix) 40 mg tablet TAKE 1 TABLET BY MOUTH EVERY DAY 90 tablet 0    gabapentin (Neurontin) 300 mg capsule TAKE 1 CAPSULE BY MOUTH TWICE A  capsule 1    glipiZIDE XL (Glucotrol XL) 10 mg 24 hr tablet Take 1 tablet (10 mg) by mouth once daily. Do not crush, chew, or split. 90 tablet 1    ipratropium (Atrovent) 21 mcg (0.03 %) nasal spray ADMINISTER 2 SPRAYS INTO EACH NOSTRIL EVERY 12 HOURS. 30 mL 1    metroNIDAZOLE (Metrogel) 0.75 % gel Apply topically 2 times a day.      mometasone-formoterol (Dulera) 200-5 mcg/actuation inhaler Inhale 2 puffs 2 times a day. 13 g 2    omeprazole (PriLOSEC) 20 mg DR capsule TAKE 1 CAPSULE BY MOUTH EVERY DAY 90 capsule 0    rosuvastatin (Crestor) 20 mg tablet TAKE 1 TABLET BY MOUTH EVERY DAY 90 tablet 0    tamsulosin (Flomax) 0.4 mg 24 hr capsule Take 1 capsule (0.4 mg) by mouth once daily. 90 capsule 3    triamcinolone (Nasacort) 55 mcg nasal inhaler Administer 2 sprays into each nostril once daily. 16.5 g 3    trospium (Sanctura XR) 60 mg 24 hour capsule Take 1 capsule (60 mg) by mouth once daily. 90 capsule 3     No current facility-administered medications for this visit.   [2]   Patient Active Problem List  Diagnosis    Abnormal finding in urine    Abnormal glucose    Acute serous otitis media    Adenomatous polyps    Anemia    Benign neoplasm of parotid gland    Parotid neoplasm    Bilateral carpal tunnel  syndrome    Blood in semen    BPH with obstruction/lower urinary tract symptoms    C. difficile colitis    CAP (community acquired pneumonia)    Chronic back pain    Chronic pain    Chronic pain of both shoulders    Chronic seromucinous otitis media, left    Complications of internal orthopedic device, implant, and graft    COPD (chronic obstructive pulmonary disease) (Multi)    Depression    Dyspnea on exertion    Elevated prostate specific antigen (PSA)    Enterococcus faecalis infection    Erectile dysfunction    Organic impotence    Esophageal reflux    Eustachian tube dysfunction    Hematuria    History of kidney cancer    Hyperglycemia    Hyperlipidemia    Hypertension    Hypogonadism male    Arthralgia    Chronic pain of right hip    Knee pain    Thigh pain    Left shoulder pain    Loose orthopedic implant    Low testosterone    Neck pain    Nephrolithiasis    Renal cyst, left    Other disorders of intestinal carbohydrate absorption    Paresthesia    Primary localized osteoarthrosis of right lower leg    Primary osteoarthritis of left knee    Infection of right prosthetic hip joint    Salivary secretion disturbance    Sensorineural hearing loss, bilateral    Sleep apnea    Splenic artery aneurysm    Strain of musc/tend the rotator cuff of right shoulder, init    Subjective tinnitus of both ears    Thoracic spine pain    Trapezius muscle spasm    Tubular adenoma    Type 2 diabetes mellitus    Vitamin D deficiency    Long term (current) use of antibiotics    Hemangioma of skin and subcutaneous tissue    Melanocytic nevi of trunk    Melanocytic nevi of unspecified lower limb, including hip    Melanocytic nevi of unspecified upper limb, including shoulder    Melanocytic nevi of other parts of face    Neoplasm of uncertain behavior of skin    Rosacea, unspecified    Actinic keratosis    Erythema intertrigo    Other seborrheic keratosis    Skin changes due to chronic exposure to nonionizing radiation, unspecified     Rhinophyma    Scar condition and fibrosis of skin    Other melanin hyperpigmentation    Non-melanoma skin cancer    Carpal tunnel syndrome    Deep incisional surgical site infection    Subjective tinnitus    Methicillin resistant Staphylococcus aureus infection    Low energy   [3]   Current Outpatient Medications   Medication Sig Dispense Refill    albuterol (Ventolin HFA) 90 mcg/actuation inhaler Inhale 2 puffs. 4-6 hrs prn      albuterol 2.5 mg /3 mL (0.083 %) nebulizer solution USE 1 UNIT DOSE EVERY 4-6 HOURS AS NEEDED FOR WHEEZING .      amLODIPine-benazepriL (LotreL) 5-20 mg capsule Take 1 capsule by mouth once daily. 100 capsule 3    cetirizine (ZyrTEC) 5 mg tablet Take 1 tablet (5 mg) by mouth once daily in the evening.      doxycycline (Periostat) 20 mg tablet Take 1 tablet (20 mg) by mouth. WITH FOOD AND WATER      DULoxetine (Cymbalta) 60 mg DR capsule TAKE 1 CAPSULE BY MOUTH EVERY DAY 90 capsule 0    empagliflozin (Jardiance) 25 mg tablet Take 1 tablet (25 mg) by mouth once daily. 90 tablet 1    furosemide (Lasix) 40 mg tablet TAKE 1 TABLET BY MOUTH EVERY DAY 90 tablet 0    gabapentin (Neurontin) 300 mg capsule TAKE 1 CAPSULE BY MOUTH TWICE A  capsule 1    glipiZIDE XL (Glucotrol XL) 10 mg 24 hr tablet Take 1 tablet (10 mg) by mouth once daily. Do not crush, chew, or split. 90 tablet 1    ipratropium (Atrovent) 21 mcg (0.03 %) nasal spray ADMINISTER 2 SPRAYS INTO EACH NOSTRIL EVERY 12 HOURS. 30 mL 1    metroNIDAZOLE (Metrogel) 0.75 % gel Apply topically 2 times a day.      mometasone-formoterol (Dulera) 200-5 mcg/actuation inhaler Inhale 2 puffs 2 times a day. 13 g 2    omeprazole (PriLOSEC) 20 mg DR capsule TAKE 1 CAPSULE BY MOUTH EVERY DAY 90 capsule 0    rosuvastatin (Crestor) 20 mg tablet TAKE 1 TABLET BY MOUTH EVERY DAY 90 tablet 0    tamsulosin (Flomax) 0.4 mg 24 hr capsule Take 1 capsule (0.4 mg) by mouth once daily. 90 capsule 3    triamcinolone (Nasacort) 55 mcg nasal inhaler Administer  2 sprays into each nostril once daily. 16.5 g 3    trospium (Sanctura XR) 60 mg 24 hour capsule Take 1 capsule (60 mg) by mouth once daily. 90 capsule 3     No current facility-administered medications for this visit.   [4]   Past Medical History:  Diagnosis Date    Chronic obstructive pulmonary disease, unspecified 2022    COPD (chronic obstructive pulmonary disease)    Sleep apnea, unspecified 2021    Sleep apnea   [5]   Past Surgical History:  Procedure Laterality Date    APPENDECTOMY  2013    Appendectomy    CT HEAD ANGIO W AND WO IV CONTRAST  2018    CT HEAD ANGIO W AND WO IV CONTRAST 2018 [facility]PATIENT LEGAC[facility]    KNEE SURGERY  2020    Knee Surgery    OTHER SURGICAL HISTORY  2013    Kidney Surgery Laparoscopic Ablation Of Renal Mass Lesion(S)    OTHER SURGICAL HISTORY  2013    Uvulectomy    OTHER SURGICAL HISTORY  2020    Back surgery    OTHER SURGICAL HISTORY  2020    Hip replacement    OTHER SURGICAL HISTORY  2020    Spinal Neurostimulator Pulse Generator    TONSILLECTOMY  2013    Tonsillectomy    UMBILICAL HERNIA REPAIR  2013    Umbilical Hernia Repair    US GUIDED ABSCESS FLUID COLLECTION DRAINAGE  2020    US GUIDED ABSCESS FLUID COLLECTION DRAINAGE 2020 [facility] INPATIENT LEGAC[facility]   [6]   Family History  Problem Relation Name Age of Onset    Pancreatic cancer Mother     [7]   Social History  Tobacco Use    Smoking status: Former     Current packs/day: 0.00     Types: Cigarettes     Quit date: 2025     Years since quittin.3    Smokeless tobacco: Never   [8]   Allergies  Allergen Reactions    Adhesive Tape-Silicones Unknown    Iodine Other    Tramadol Hives and Rash

## 2025-05-29 ENCOUNTER — OFFICE VISIT (OUTPATIENT)
Dept: UROLOGY | Facility: HOSPITAL | Age: 70
End: 2025-05-29
Payer: MEDICARE

## 2025-05-29 DIAGNOSIS — E29.1 HYPOGONADISM MALE: Primary | ICD-10-CM

## 2025-05-29 DIAGNOSIS — R53.83 LOW ENERGY: ICD-10-CM

## 2025-05-29 DIAGNOSIS — Z12.5 PROSTATE CANCER SCREENING: ICD-10-CM

## 2025-05-29 PROCEDURE — 3052F HG A1C>EQUAL 8.0%<EQUAL 9.0%: CPT | Performed by: UROLOGY

## 2025-05-29 PROCEDURE — 1159F MED LIST DOCD IN RCRD: CPT | Performed by: UROLOGY

## 2025-05-29 PROCEDURE — G2211 COMPLEX E/M VISIT ADD ON: HCPCS | Performed by: UROLOGY

## 2025-05-29 PROCEDURE — 99214 OFFICE O/P EST MOD 30 MIN: CPT | Performed by: UROLOGY

## 2025-05-30 DIAGNOSIS — Z00.00 ENCOUNTER FOR GENERAL ADULT MEDICAL EXAMINATION WITHOUT ABNORMAL FINDINGS: ICD-10-CM

## 2025-05-30 DIAGNOSIS — E78.5 DYSLIPIDEMIA: ICD-10-CM

## 2025-05-30 DIAGNOSIS — K21.9 GASTROESOPHAGEAL REFLUX DISEASE WITHOUT ESOPHAGITIS: ICD-10-CM

## 2025-05-30 RX ORDER — FUROSEMIDE 40 MG/1
40 TABLET ORAL DAILY
Qty: 90 TABLET | Refills: 0 | Status: SHIPPED | OUTPATIENT
Start: 2025-05-30

## 2025-05-30 RX ORDER — ROSUVASTATIN CALCIUM 20 MG/1
20 TABLET, COATED ORAL DAILY
Qty: 90 TABLET | Refills: 0 | Status: SHIPPED | OUTPATIENT
Start: 2025-05-30

## 2025-05-30 RX ORDER — OMEPRAZOLE 20 MG/1
20 CAPSULE, DELAYED RELEASE ORAL DAILY
Qty: 90 CAPSULE | Refills: 0 | Status: SHIPPED | OUTPATIENT
Start: 2025-05-30

## 2025-06-04 DIAGNOSIS — N52.8 OTHER MALE ERECTILE DYSFUNCTION: Primary | ICD-10-CM

## 2025-06-04 RX ORDER — SILDENAFIL 100 MG/1
100 TABLET, FILM COATED ORAL DAILY PRN
Qty: 30 TABLET | Refills: 5 | Status: SHIPPED | OUTPATIENT
Start: 2025-06-04

## 2025-06-05 DIAGNOSIS — E29.1 HYPOGONADISM MALE: ICD-10-CM

## 2025-06-05 RX ORDER — SYRINGE W-NEEDLE,DISPOSAB,3 ML 25GX5/8"
1 SYRINGE, EMPTY DISPOSABLE MISCELLANEOUS WEEKLY
Qty: 16 EACH | Refills: 2 | Status: SHIPPED | OUTPATIENT
Start: 2025-06-05 | End: 2025-09-03

## 2025-06-05 RX ORDER — TESTOSTERONE CYPIONATE 1000 MG/10ML
100 INJECTION, SOLUTION INTRAMUSCULAR WEEKLY
Qty: 4 ML | Refills: 2 | Status: SHIPPED | OUTPATIENT
Start: 2025-06-05 | End: 2025-06-05 | Stop reason: DRUGHIGH

## 2025-06-05 RX ORDER — TESTOSTERONE CYPIONATE 200 MG/ML
100 INJECTION, SOLUTION INTRAMUSCULAR WEEKLY
Qty: 4 ML | Refills: 3 | Status: SHIPPED | OUTPATIENT
Start: 2025-06-05 | End: 2025-09-03

## 2025-06-09 DIAGNOSIS — J30.9 ALLERGIC RHINITIS, UNSPECIFIED SEASONALITY, UNSPECIFIED TRIGGER: ICD-10-CM

## 2025-06-09 RX ORDER — IPRATROPIUM BROMIDE 21 UG/1
2 SPRAY, METERED NASAL EVERY 12 HOURS
Qty: 30 ML | Refills: 3 | Status: SHIPPED | OUTPATIENT
Start: 2025-06-09 | End: 2026-06-09

## 2025-06-11 ENCOUNTER — APPOINTMENT (OUTPATIENT)
Dept: DERMATOLOGY | Facility: CLINIC | Age: 70
End: 2025-06-11
Payer: MEDICARE

## 2025-06-11 DIAGNOSIS — L57.0 ACTINIC KERATOSIS: ICD-10-CM

## 2025-06-11 DIAGNOSIS — L71.9 ROSACEA: ICD-10-CM

## 2025-06-11 DIAGNOSIS — L57.8 SUN-DAMAGED SKIN: ICD-10-CM

## 2025-06-11 DIAGNOSIS — D48.5 NEOPLASM OF UNCERTAIN BEHAVIOR OF SKIN: ICD-10-CM

## 2025-06-11 DIAGNOSIS — L29.9 PRURITUS: ICD-10-CM

## 2025-06-11 DIAGNOSIS — D18.01 CHERRY ANGIOMA: ICD-10-CM

## 2025-06-11 DIAGNOSIS — Z12.83 SCREENING EXAM FOR SKIN CANCER: ICD-10-CM

## 2025-06-11 DIAGNOSIS — L82.1 SEBORRHEIC KERATOSES: ICD-10-CM

## 2025-06-11 DIAGNOSIS — D22.9 MULTIPLE BENIGN MELANOCYTIC NEVI: Primary | ICD-10-CM

## 2025-06-11 DIAGNOSIS — L90.5 SCAR CONDITIONS AND FIBROSIS OF SKIN: ICD-10-CM

## 2025-06-11 PROCEDURE — 3052F HG A1C>EQUAL 8.0%<EQUAL 9.0%: CPT | Performed by: DERMATOLOGY

## 2025-06-11 PROCEDURE — 17003 DESTRUCT PREMALG LES 2-14: CPT

## 2025-06-11 PROCEDURE — 1159F MED LIST DOCD IN RCRD: CPT | Performed by: DERMATOLOGY

## 2025-06-11 PROCEDURE — 99213 OFFICE O/P EST LOW 20 MIN: CPT | Performed by: DERMATOLOGY

## 2025-06-11 PROCEDURE — 11102 TANGNTL BX SKIN SINGLE LES: CPT

## 2025-06-11 PROCEDURE — 17000 DESTRUCT PREMALG LESION: CPT

## 2025-06-11 ASSESSMENT — DERMATOLOGY PATIENT ASSESSMENT
DO YOU HAVE ANY NEW OR CHANGING LESIONS: NO
HAVE YOU HAD OR DO YOU HAVE A STAPH INFECTION: NO
ARE YOU AN ORGAN TRANSPLANT RECIPIENT: NO
HAVE YOU HAD OR DO YOU HAVE VASCULAR DISEASE: NO
DO YOU USE A TANNING BED: NO
DO YOU USE SUNSCREEN: DAILY

## 2025-06-11 ASSESSMENT — DERMATOLOGY QUALITY OF LIFE (QOL) ASSESSMENT
RATE HOW BOTHERED YOU ARE BY EFFECTS OF YOUR SKIN PROBLEMS ON YOUR ACTIVITIES (EG, GOING OUT, ACCOMPLISHING WHAT YOU WANT, WORK ACTIVITIES OR YOUR RELATIONSHIPS WITH OTHERS): 1
RATE HOW EMOTIONALLY BOTHERED YOU ARE BY YOUR SKIN PROBLEM (FOR EXAMPLE, WORRY, EMBARRASSMENT, FRUSTRATION): 2
DATE THE QUALITY-OF-LIFE ASSESSMENT WAS COMPLETED: 67367
RATE HOW BOTHERED YOU ARE BY SYMPTOMS OF YOUR SKIN PROBLEM (EG, ITCHING, STINGING BURNING, HURTING OR SKIN IRRITATION): 2

## 2025-06-11 ASSESSMENT — ITCH NUMERIC RATING SCALE: HOW SEVERE IS YOUR ITCHING?: 2

## 2025-06-11 ASSESSMENT — PATIENT GLOBAL ASSESSMENT (PGA): PATIENT GLOBAL ASSESSMENT: PATIENT GLOBAL ASSESSMENT:  2 - MILD

## 2025-06-11 NOTE — Clinical Note
-Encouraged Sarna sensitive skin lotion 2-4x daily for the shoulders  -Advise a thick moisturizer for dry skin on lower legs, such as aveeno gel with colloidal oatmeal since he is less apt to use creams to ointments  -For areas of chaffing in axillae and antecubital fossae, allow to fully dry after bathing then purchase glide stick for men and use generously in these areas daily. No signs of intertrigo today.

## 2025-06-11 NOTE — PROGRESS NOTES
Subjective     Sarthak Portillo is a 70 y.o. male who presents for the following: Skin Check (Spot of concern on left arm and scalp.). States spots on left arm and scalp that feel gritty/scaly. Patient reports no other spots of concern, no lesions that are itchy, painful, bleeding, or changing.    Skin Cancer History  Biopsy Log Book  Biopsied Type Location Status   4/26/24 BCC Right Parotid Area Treatment Complete  9/11/24       Additional History      Review of Systems:  No other skin or systemic complaints other than what is documented elsewhere in the note.    The following portions of the chart were reviewed this encounter and updated as appropriate:       Specialty Problems          Dermatology Problems    Actinic keratosis    Erythema intertrigo    Hemangioma of skin and subcutaneous tissue    Melanocytic nevi of other parts of face    Melanocytic nevi of trunk    Melanocytic nevi of unspecified lower limb, including hip    Melanocytic nevi of unspecified upper limb, including shoulder    Neoplasm of uncertain behavior of skin    Other melanin hyperpigmentation    Other seborrheic keratosis    Rhinophyma    Rosacea, unspecified    Scar condition and fibrosis of skin    Skin changes due to chronic exposure to nonionizing radiation, unspecified    Non-melanoma skin cancer    Lesion 1: Superficial Basal Cell Carcinoma. Deep margins involved. Year Diagnosed: 2023. September. Location: Right Medial Mid Calf Treatment(s): curretage with Dr. David/[facility] Pathology: [id number]    Lesion 2: right parotid BCC s/p mohs 2024          Past Medical History:  Sarthak Portillo  has a past medical history of Chronic obstructive pulmonary disease, unspecified (11/21/2022) and Sleep apnea, unspecified (01/29/2021).    Past Surgical History:  Sarthak Portillo  has a past surgical history that includes Tonsillectomy (04/04/2013); Other surgical history (04/04/2013); Appendectomy (04/04/2013); Other surgical history (04/04/2013);  Umbilical hernia repair (04/04/2013); Other surgical history (03/14/2020); Other surgical history (03/14/2020); Knee surgery (03/14/2020); Other surgical history (03/14/2020); US guided abscess fluid collection drainage (8/19/2020); and CT angio head w and wo IV contrast (11/28/2018).    Family History:  Patient family history includes Pancreatic cancer in his mother.    Social History:  Sarthak Portillo  reports that he quit smoking about 4 months ago. His smoking use included cigarettes. He has never used smokeless tobacco. No history on file for alcohol use and drug use.    Allergies:  Adhesive tape-silicones, Iodine, and Tramadol    Current Medications / CAM's:  Current Medications[1]     Objective   Well appearing patient in no apparent distress; mood and affect are within normal limits.    A full examination was performed including scalp, head, eyes, ears, nose, lips, neck, chest, axillae, abdomen, back, buttocks, bilateral upper extremities, bilateral lower extremities, hands, feet, fingers, toes, fingernails, and toenails. Patient declined genital and gluteal cleft exam. All findings within normal limits unless otherwise noted below.    - scattered regular brown macules and papules  - Scattered waxy tan/grey/brown papules with horn cysts  - scattered small bright red papules and macules  - scattered tan macules, telangiectasias, and general photo-damage  Pruritus in areas of actinic damage on scalp, shoulders    Xerotic skin on lower legs    No rash  - Well healed scar at prior treatment sites without visual or palpable evidence of recurrence.   Mid Frontal Scalp, Mid Parietal Scalp (2)  Erythematous macules with gritty scale.  Left Shoulder - Posterior  1cm pink pearly papule    Head - Anterior (Face), Nose  Rhinophymatous nose and scattered telangiectasias, healing following hot loop procedure       Assessment/Plan   MULTIPLE BENIGN MELANOCYTIC NEVI  Generalized  Benign melanocytic nevi  - Discussed benign  nature and that no treatment is necessary unless it becomes painful or increases in size. Patient opts for clinical monitoring at this time.    - Sun protective behavior reviewed and encouraged including the use of over-the-counter sunscreen with SPF30+ daily (reapply every 1.5 hours when outdoors), UPF clothing, broad rimmed hats, sunglasses, and avoidance of midday sun. Home skin monitoring encouraged and how to monitor for skin cancer (changing or new moles, new rapidly growing or non-healing lesions) reviewed. Patient encouraged to call with interval concerns or changes.    ACTINIC KERATOSIS (3)  Mid Frontal Scalp, Mid Parietal Scalp (2)  - The premalignant nature of the disorder was reviewed and treatment options were reviewed.   - Patient agreeable to treatment with cryotherapy today.  Sites confirmed. Risks and benefits reviewed including but not limited to pain, redness, swelling, blister, scab, healing with hypo or hyperpigmentation, and scar. Chance of recurrence or persistence reviewed.   Destr of lesion - Mid Frontal Scalp, Mid Parietal Scalp (2)  Complexity: simple    Destruction method: cryotherapy    Informed consent: discussed and consent obtained    Lesion destroyed using liquid nitrogen: Yes    Region frozen until ice ball extended beyond lesion: Yes    Cryotherapy cycles:  1  Outcome: patient tolerated procedure well with no complications    Post-procedure details: wound care instructions given    NEOPLASM OF UNCERTAIN BEHAVIOR OF SKIN  Left Shoulder - Posterior  Lesion biopsy  Type of biopsy: tangential    Informed consent: discussed and consent obtained    Timeout: patient name, date of birth, surgical site, and procedure verified    Procedure prep:  Patient was prepped and draped  Anesthesia: the lesion was anesthetized in a standard fashion    Anesthetic:  1% lidocaine w/ epinephrine 1-100,000 local infiltration  Instrument used: DermaBlade    Hemostasis achieved with: aluminum chloride     Outcome: patient tolerated procedure well    Post-procedure details: sterile dressing applied and wound care instructions given    Dressing type: petrolatum and bandage    Specimen 1 - Dermatopathology- DERM LAB  Differential Diagnosis: rule out basal cell carcinoma (infiltrative vs. Morpheaform)   Check Margins Yes/No?:    Comments:    Dermpath Lab: Routine Histopathology (formalin-fixed tissue)  ROSACEA  Head - Anterior (Face), Nose  Rosacea with papulopustular component and notable rhinophymatous and telangiectatic component  - Completed hot loop procedure for rhinophyma with Dr. Jacobson 8/8/24  - Discussed massaging the scar tissue and using strict UV protection on the nose to promote continued wound healing  - Continue metrogel bid  - Continue strict UV protection daily  - Encouraged smoking cessation as associated with worsening skin changes noted above  - Notes that doxycycline PO was not tolerated due to loose stools  SEBORRHEIC KERATOSES  Generalized  Seborrheic keratosis (-es)  - Discussed benign nature and that no treatment is necessary unless it becomes painful or increases in size. Patient opts for clinical monitoring at this time.    COYLE ANGIOMA  Generalized  Cherry angioma(s)  - Discussed benign nature and that no treatment is necessary unless it becomes painful or increases in size. Patient opts for clinical monitoring at this time.    SUN-DAMAGED SKIN  Generalized  Actinically damaged skin-  - Sun protective behavior reviewed and encouraged including the use of over-the-counter sunscreen with SPF30+ daily (reapply every 1.5 hours when outdoors), UPF clothing, broad rimmed hats, sunglasses, and avoidance of midday sun. Home skin monitoring encouraged and how to monitor for skin cancer (changing or new moles, new rapidly growing or non-healing lesions) reviewed. Patient encouraged to call with interval concerns or changes.    PRURITUS  Generalized  -Encouraged Sarna sensitive skin lotion 2-4x  daily for the shoulders  -Advise a thick moisturizer for dry skin on lower legs, such as aveeno gel with colloidal oatmeal since he is less apt to use creams to ointments  -For areas of chaffing in axillae and antecubital fossae, allow to fully dry after bathing then purchase glide stick for men and use generously in these areas daily. No signs of intertrigo today.   SCAR CONDITIONS AND FIBROSIS OF SKIN  Generalized  - Well healed scar(s) at sites of prior skin cancer- h/o basal cell carcinoma right lower leg 2023 s/p curettage; right cheek basal cell carcinoma 2024 s/p mohs  - Sun protective behavior reviewed and encouraged including the use of over-the-counter sunscreen with SPF30+ daily (reapply every 1.5 hours when outdoors), UPF clothing, broad rimmed hats, sunglasses, and avoidance of midday sun. Home skin monitoring encouraged and how to monitor for skin cancer (changing or new moles, new rapidly growing or non-healing lesions) reviewed. Patient encouraged to call with interval concerns or changes.     SCREENING EXAM FOR SKIN CANCER      Related Procedures  Follow Up In Dermatology - Established Patient     Return to clinic in 6 months for full body skin check.    Lora Cardenas MD  Department of Dermatology    I saw and evaluated the patient, participating in the key elements of the service.  I discussed the findings, assessment and plan with the resident and agree with resident’s findings and plan as documented in the resident's note.  I was immediately available for the entirety of the procedure(s) and present for the key and critical portions.     Meredith Sparks MD          [1]   Current Outpatient Medications:     albuterol (Ventolin HFA) 90 mcg/actuation inhaler, Inhale 2 puffs. 4-6 hrs prn, Disp: , Rfl:     albuterol 2.5 mg /3 mL (0.083 %) nebulizer solution, USE 1 UNIT DOSE EVERY 4-6 HOURS AS NEEDED FOR WHEEZING ., Disp: , Rfl:     amLODIPine-benazepriL (LotreL) 5-20 mg capsule, Take 1 capsule by  "mouth once daily., Disp: 100 capsule, Rfl: 3    cetirizine (ZyrTEC) 5 mg tablet, Take 1 tablet (5 mg) by mouth once daily in the evening., Disp: , Rfl:     doxycycline (Periostat) 20 mg tablet, Take 1 tablet (20 mg) by mouth. WITH FOOD AND WATER, Disp: , Rfl:     DULoxetine (Cymbalta) 60 mg DR capsule, TAKE 1 CAPSULE BY MOUTH EVERY DAY, Disp: 90 capsule, Rfl: 0    empagliflozin (Jardiance) 25 mg tablet, Take 1 tablet (25 mg) by mouth once daily., Disp: 90 tablet, Rfl: 1    furosemide (Lasix) 40 mg tablet, TAKE 1 TABLET BY MOUTH EVERY DAY, Disp: 90 tablet, Rfl: 0    gabapentin (Neurontin) 300 mg capsule, TAKE 1 CAPSULE BY MOUTH TWICE A DAY, Disp: 180 capsule, Rfl: 1    glipiZIDE XL (Glucotrol XL) 10 mg 24 hr tablet, Take 1 tablet (10 mg) by mouth once daily. Do not crush, chew, or split., Disp: 90 tablet, Rfl: 1    ipratropium (Atrovent) 21 mcg (0.03 %) nasal spray, ADMINISTER 2 SPRAYS INTO EACH NOSTRIL EVERY 12 HOURS., Disp: 30 mL, Rfl: 3    metroNIDAZOLE (Metrogel) 0.75 % gel, Apply topically 2 times a day., Disp: , Rfl:     mometasone-formoterol (Dulera) 200-5 mcg/actuation inhaler, Inhale 2 puffs 2 times a day., Disp: 13 g, Rfl: 2    needle, disp, 18 G 18 gauge x 1 1/2\" needle, Use for medication draw, Disp: 16 each, Rfl: 2    omeprazole (PriLOSEC) 20 mg DR capsule, TAKE 1 CAPSULE BY MOUTH EVERY DAY, Disp: 90 capsule, Rfl: 0    rosuvastatin (Crestor) 20 mg tablet, TAKE 1 TABLET BY MOUTH EVERY DAY, Disp: 90 tablet, Rfl: 0    sildenafil (Viagra) 100 mg tablet, Take 1 tablet (100 mg) by mouth once daily as needed for erectile dysfunction., Disp: 30 tablet, Rfl: 5    syringe with needle (Carepoint Luer Lock Syr-needle) 3 mL 22 x 1 1/2\" syringe, 1 each 1 (one) time per week., Disp: 16 each, Rfl: 2    tamsulosin (Flomax) 0.4 mg 24 hr capsule, Take 1 capsule (0.4 mg) by mouth once daily., Disp: 90 capsule, Rfl: 3    testosterone cypionate (Depo-Testosterone) 200 mg/mL injection, Inject 0.5 mL (100 mg) into the muscle 1 " (one) time per week., Disp: 4 mL, Rfl: 3    triamcinolone (Nasacort) 55 mcg nasal inhaler, Administer 2 sprays into each nostril once daily., Disp: 16.5 g, Rfl: 3    trospium (Sanctura XR) 60 mg 24 hour capsule, Take 1 capsule (60 mg) by mouth once daily., Disp: 90 capsule, Rfl: 3

## 2025-06-11 NOTE — Clinical Note
Rosacea with papulopustular component and notable rhinophymatous and telangiectatic component  - Completed hot loop procedure for rhinophyma with Dr. Jacobson 8/8/24  - Discussed massaging the scar tissue and using strict UV protection on the nose to promote continued wound healing  - Continue metrogel bid  - Continue strict UV protection daily  - Encouraged smoking cessation as associated with worsening skin changes noted above  - Notes that doxycycline PO was not tolerated due to loose stools

## 2025-06-11 NOTE — Clinical Note
- Well healed scar(s) at sites of prior skin cancer- h/o basal cell carcinoma right lower leg 2023 s/p curettage; right cheek basal cell carcinoma 2024 s/p mohs  - Sun protective behavior reviewed and encouraged including the use of over-the-counter sunscreen with SPF30+ daily (reapply every 1.5 hours when outdoors), UPF clothing, broad rimmed hats, sunglasses, and avoidance of midday sun. Home skin monitoring encouraged and how to monitor for skin cancer (changing or new moles, new rapidly growing or non-healing lesions) reviewed. Patient encouraged to call with interval concerns or changes.

## 2025-06-17 DIAGNOSIS — C44.611 BASAL CELL CARCINOMA (BCC) OF SKIN OF UPPER EXTREMITY INCLUDING SHOULDER, UNSPECIFIED LATERALITY: ICD-10-CM

## 2025-06-26 ENCOUNTER — APPOINTMENT (OUTPATIENT)
Dept: DERMATOLOGY | Facility: CLINIC | Age: 70
End: 2025-06-26
Payer: MEDICARE

## 2025-06-26 VITALS — SYSTOLIC BLOOD PRESSURE: 113 MMHG | DIASTOLIC BLOOD PRESSURE: 66 MMHG | HEART RATE: 102 BPM

## 2025-06-26 DIAGNOSIS — C44.619 BASAL CELL CARCINOMA (BCC) OF SKIN OF LEFT UPPER EXTREMITY INCLUDING SHOULDER: ICD-10-CM

## 2025-06-26 PROCEDURE — 13121 CMPLX RPR S/A/L 2.6-7.5 CM: CPT | Performed by: STUDENT IN AN ORGANIZED HEALTH CARE EDUCATION/TRAINING PROGRAM

## 2025-06-26 PROCEDURE — 17313 MOHS 1 STAGE T/A/L: CPT | Performed by: STUDENT IN AN ORGANIZED HEALTH CARE EDUCATION/TRAINING PROGRAM

## 2025-06-26 RX ORDER — ASPIRIN 81 MG/1
81 TABLET ORAL DAILY
COMMUNITY

## 2025-06-26 NOTE — PROGRESS NOTES
Office Visit Note  Date: 6/26/2025  Surgeon:  Jorje Dunacn MD  Office Location: [facility] [place] [address] [address]  Dept: [phone]  Dept Fax: [phone]  Referring Provider: Meredith Sparks MD  [id number] [address]  Department of Dermatology  [address]   Sarthak Portillo is a 70 y.o. male who presents for the following: MOHS Surgery (Left Shoulder-Posterior)    According to the patient, the lesion has been present for approximately 6 months at the time of diagnosis.  The lesion is not causing symptoms.  The lesion has not been treated previously.    The patient does not have a pacemaker / defibrillator.  The patient does have a heart valve / joint replacement.    The patient is on blood thinners.  The patient does not have a history of hepatitis B or C.  The patient does not have a history of HIV.  The patient does not have a history of immunosuppression (e.g. organ transplantation, malignancy, medications)    Review of Systems:  No other skin or systemic complaints other than what is documented elsewhere in the note.    MEDICAL HISTORY: clinically relevant history including significant past medical history, medications and allergies was reviewed and documented in Epic.    Objective   Well appearing patient in no apparent distress; mood and affect are within normal limits.  Vital signs: See record.  Noted on the   Left Shoulder - Posterior  Is a 1 x 1.5 cm scar    The patient confirmed the identified site.    Discussion:  The nature of the diagnosis was explained. The lesion is a skin cancer.  It has a risk of local growth and distant spread. The condition is associated with sun exposure.  Warning signs of non-melanoma skin cancer discussed. Patient was instructed to perform monthly self skin examination.  We recommended that the patient have regular full skin exams given an increased risk of subsequent skin cancers. The patient was  instructed to use sun protective behaviors including use of broad spectrum sunscreens and sun protective clothing to reduce risk of skin cancers.      Risks, benefits, side effects of Mohs surgery were discussed with patient and the patient voiced understanding.  It was explained that even though the cure rate of Mohs is very high it is not 100%. Risks of surgery including but not limited to bleeding, infection, numbness, nerve damage, and scar were reviewed.  Discussion included wound care requirements, activity restrictions, likely scar outcome and time to heal.     After Mohs surgery, the defect may need to be repaired surgically and the scar may be longer than the original lesion.  Reconstruction options, risks, and benefits were reviewed including second intention healing, linear repair (4-1 ratio was explained), local flaps, skin grafts, cartilage grafts and interpolation flaps (the need for multiple surgeries was explained). Possible outcomes were reviewed including likely scar appearance, failure of flap survival, infection, bleeding and the need for revision surgery.     The pathology was reviewed, the photograph was reviewed, and the referring physician's note was reviewed.    Patient elected for Mohs surgery.     IDahlia RN  am scribing for, and in the presence of MD JESSICA Kwong Jake X Wang, MD, personally performed the services described in the documentation as scribed by  Dahlia Osborne RN  in my presence, and confirm it is both accurate and complete.

## 2025-06-26 NOTE — PROGRESS NOTES
Mohs Surgery Operative Note    Date of Surgery:  6/26/2025  Surgeon:  Jorje Duncan MD  Office Location: [facility] [place] [address] [address]  Dept: [phone]  Dept Fax: [phone]  Referring Provider: Meredith Sparks MD  [id number] [address]  Department [place] [address]      Assessment/Plan   Pre-procedure:   Obtained informed consent: written from patient  The surgical site was identified and confirmed with the patient.     Intra-operative:   Audible time out called at : 09:30 AM 06/26/25  by: JULIO LAUGHLIN RN   Verified patient name, birthdate, site, specimen bottle label & requisition.    The planned procedure(s) was again reviewed with the patient. The risks of bleeding, infection, nerve damage and scarring were reviewed. Written authorization was obtained. The patient identity, surgical site, and planned procedure(s) were verified. The provider acted as both surgeon and pathologist.     BASAL CELL CARCINOMA (BCC) OF SKIN OF LEFT UPPER EXTREMITY INCLUDING SHOULDER  Left Shoulder - Posterior  Mohs surgery    Consent obtained: written    Universal Protocol:  Procedure explained and questions answered to patient or proxy's satisfaction: Yes    Test results available and properly labeled: Yes    Pathology report reviewed: Yes    External notes reviewed: Yes    Photo or diagram used for site identification: Yes    Site/side marked: Yes    Slide independently reviewed by Mohs surgeon: Yes    Immediately prior to procedure a time out was called: Yes    Patient identity confirmed: verbally with patient  Preparation: Patient was prepped and draped in usual sterile fashion      Anticoagulation:  Is the patient taking prescription anticoagulant and/or aspirin prescribed/recommended by a physician? Yes    Was the anticoagulation regimen changed prior to Mohs? No      Anesthesia:  Anesthesia method: local infiltration  Local anesthetic: lidocaine  1% WITH epi    Procedure Details:  Case ID Number: [id number]  Biopsy accession number: [id number]  Date of biopsy: 6/11/2025  Pre-Op diagnosis: basal cell carcinoma  BCC subtype: infiltrative  Surgical site (from skin exam): Left Shoulder - Posterior  Pre-operative length (cm): 1  Pre-operative width (cm): 1.5  Indications for Mohs surgery: aggressive histology    Micrographic Surgery Details:  Post-operative length (cm): 1.9  Post-operative width (cm): 1.8  Number of Mohs stages: 1    Stage 1     Comments: The patient was brought into the operating room and placed in the procedure chair in the appropriate position.  The area positive by previous biopsy was identified and confirmed with the patient. The area of clinically obvious tumor was debulked using a curette and/or scalpel as needed. An incision was made following the Mohs approach through the skin. The specimen was taken to the lab, divided into 2 piece(s) and appropriately chromacoded and processed.             Tumor features identified on Mohs section: no tumor identified    Depth of defect: subcutaneous fat    Patient tolerance of procedure: tolerated well, no immediate complications    Reconstruction:  Was the defect reconstructed? Yes    Was reconstruction performed by the same Mohs surgeon? Yes    Setting of reconstruction: outpatient office  When was reconstruction performed? same day  Type of reconstruction: linear  Linear reconstruction: complex  Length of linear repair (cm): 4  Subcutaneous Layers (Deep Stitches)   Suture size:  3-0  Suture type:  Vicryl  Stitches:  Buried vertical mattress  Fine/surface layer approximation (top stitches)   Epidermal/Superficial suture size:  4-0  Epidermal/Superficial suture type:  Prolene  Stitches: simple running    Suture removal (days):  11  Hemostasis achieved with: electrodesiccation  Outcome: patient tolerated procedure well with no complications    Post-procedure details: sterile dressing applied and wound  care instructions given    Dressing type: pressure dressing      Staff Communication: Dermatology Local Anesthesia: 1 % Lidocaine / Epinephrine - Amount: 3.5cc    Complex Linear Repair - Wide Undermining:  Given the location and size of the defect, it was determined that a complex layered linear closure was required to restore normal anatomy and function. The repair was considered complex because extensive undermining was required and performed. The amount of undermining performed was greater than the maximum width of the defect as measured perpendicular to the closure line along at least one entire edge of the defect. Standing cutaneous cones were removed using Burow's triangles. The wound edges were brought into close approximation with buried vertical mattress sutures. The remainder of the wound was then closed with epidermal top sutures.    The final repair measured 4.0 cm                Wound care was discussed, and the patient was given written post-operative wound care instructions.      The patient will follow up with Jorje Duncan MD in 11 days for suture removal, and will follow up with their primary dermatologist as scheduled.       Dahlia LOPEZ RN  am scribing for, and in the presence of MD JESSICA Kwong Jake X Wang, MD, personally performed the services described in the documentation as scribed by Dahlia Osborne RN  in my presence, and confirm it is both accurate and complete.

## 2025-06-29 DIAGNOSIS — Z12.5 PROSTATE CANCER SCREENING: ICD-10-CM

## 2025-06-29 DIAGNOSIS — R53.83 LOW ENERGY: ICD-10-CM

## 2025-06-30 ENCOUNTER — TELEPHONE (OUTPATIENT)
Dept: DERMATOLOGY | Facility: CLINIC | Age: 70
End: 2025-06-30
Payer: MEDICARE

## 2025-06-30 NOTE — TELEPHONE ENCOUNTER
Called and left voicemail asking that patient give us a call back to let us know if he can come in the morning for his POV with Dr. Duncan on 7/7/25 instead of the afternoon. Awaiting call back.     7/2/25 left another voicemail asking that he give us a call back so we can change his appointment from the afternoon to the morning, awaiting call back.    Patient called and left a message back stating that he can come in at 11:30am for his appointment, will message [facility] staff to move his appointment and left return voicemail confirming the time will be changed.

## 2025-07-07 ENCOUNTER — APPOINTMENT (OUTPATIENT)
Dept: DERMATOLOGY | Facility: CLINIC | Age: 70
End: 2025-07-07
Payer: MEDICARE

## 2025-07-07 DIAGNOSIS — Z51.89 VISIT FOR WOUND CHECK: ICD-10-CM

## 2025-07-07 PROCEDURE — 99024 POSTOP FOLLOW-UP VISIT: CPT | Performed by: STUDENT IN AN ORGANIZED HEALTH CARE EDUCATION/TRAINING PROGRAM

## 2025-07-07 PROCEDURE — 3052F HG A1C>EQUAL 8.0%<EQUAL 9.0%: CPT | Performed by: STUDENT IN AN ORGANIZED HEALTH CARE EDUCATION/TRAINING PROGRAM

## 2025-07-07 NOTE — PROGRESS NOTES
Office Follow Up Note    Visit Summary  Chief Complaint    1. Complaint Wound check.    Sarthak Portillo is a 70 y.o. male who presents for 10 day follow up after surgery for a basal cell carcinoma. The patient has no concerns today.     Location Operation site location: Left Shoulder - Posterior    On exam,  Mr. Portillo is well-appearing and in no apparent distress. The surgical site appears clean with minimal to no erythema. No tenderness and good wound edge apposition.    Assessment and Plan:  History of skin cancer requiring ongoing monitoring for recurrence and additional lesion development.   The patient was reassured that the wound is healing appropriately.   Sutures were removed without complication today.  The dressing was removed, the wound cleaned a a new dressing reapplied.     The patient was advised on the importance of routine skin monitoring including follow up with general dermatology and instructed to call with any further concerns. The patient will return in as needed     Julianna LOPEZ RN am scribing for, and in the presence of MD JESSICA Kwong Jake X Wang, MD, personally performed the services described in the documentation as scribed by Julianna Mireles RN in my presence, and confirm it is both accurate and complete.

## 2025-07-16 ENCOUNTER — APPOINTMENT (OUTPATIENT)
Dept: DERMATOLOGY | Facility: CLINIC | Age: 70
End: 2025-07-16
Payer: MEDICARE

## 2025-07-31 DIAGNOSIS — E78.5 DYSLIPIDEMIA: ICD-10-CM

## 2025-07-31 DIAGNOSIS — K21.9 GASTROESOPHAGEAL REFLUX DISEASE WITHOUT ESOPHAGITIS: ICD-10-CM

## 2025-07-31 DIAGNOSIS — J30.9 ALLERGIC RHINITIS, UNSPECIFIED SEASONALITY, UNSPECIFIED TRIGGER: ICD-10-CM

## 2025-07-31 DIAGNOSIS — Z00.00 ENCOUNTER FOR GENERAL ADULT MEDICAL EXAMINATION WITHOUT ABNORMAL FINDINGS: ICD-10-CM

## 2025-07-31 DIAGNOSIS — M17.10 UNILATERAL PRIMARY OSTEOARTHRITIS, UNSPECIFIED KNEE: ICD-10-CM

## 2025-07-31 DIAGNOSIS — E11.69 TYPE 2 DIABETES MELLITUS WITH OTHER SPECIFIED COMPLICATION, UNSPECIFIED WHETHER LONG TERM INSULIN USE (MULTI): ICD-10-CM

## 2025-07-31 DIAGNOSIS — N52.9 MALE ERECTILE DYSFUNCTION, UNSPECIFIED: ICD-10-CM

## 2025-07-31 RX ORDER — FUROSEMIDE 40 MG/1
40 TABLET ORAL DAILY
Qty: 90 TABLET | Refills: 0 | Status: SHIPPED | OUTPATIENT
Start: 2025-07-31

## 2025-07-31 RX ORDER — IPRATROPIUM BROMIDE 21 UG/1
2 SPRAY, METERED NASAL EVERY 12 HOURS
Qty: 30 ML | Refills: 1 | Status: SHIPPED | OUTPATIENT
Start: 2025-07-31 | End: 2026-07-31

## 2025-07-31 RX ORDER — OMEPRAZOLE 20 MG/1
20 CAPSULE, DELAYED RELEASE ORAL DAILY
Qty: 90 CAPSULE | Refills: 0 | Status: SHIPPED | OUTPATIENT
Start: 2025-07-31

## 2025-07-31 RX ORDER — ROSUVASTATIN CALCIUM 20 MG/1
20 TABLET, COATED ORAL DAILY
Qty: 90 TABLET | Refills: 0 | Status: SHIPPED | OUTPATIENT
Start: 2025-07-31

## 2025-07-31 RX ORDER — DULOXETIN HYDROCHLORIDE 60 MG/1
60 CAPSULE, DELAYED RELEASE ORAL DAILY
Qty: 90 CAPSULE | Refills: 0 | Status: SHIPPED | OUTPATIENT
Start: 2025-07-31

## 2025-07-31 RX ORDER — GLIPIZIDE 10 MG/1
10 TABLET, FILM COATED, EXTENDED RELEASE ORAL DAILY
Qty: 90 TABLET | Refills: 1 | Status: SHIPPED | OUTPATIENT
Start: 2025-07-31 | End: 2026-07-31

## 2025-07-31 RX ORDER — GABAPENTIN 300 MG/1
300 CAPSULE ORAL 2 TIMES DAILY
Qty: 180 CAPSULE | Refills: 1 | Status: SHIPPED | OUTPATIENT
Start: 2025-07-31

## 2025-08-13 ENCOUNTER — TELEPHONE (OUTPATIENT)
Dept: PRIMARY CARE | Facility: CLINIC | Age: 70
End: 2025-08-13
Payer: MEDICARE

## 2025-08-13 ENCOUNTER — HOSPITAL ENCOUNTER (OUTPATIENT)
Dept: RADIOLOGY | Facility: CLINIC | Age: 70
Discharge: HOME | End: 2025-08-13
Payer: MEDICARE

## 2025-08-13 DIAGNOSIS — F17.211 CIGARETTE NICOTINE DEPENDENCE IN REMISSION: Primary | ICD-10-CM

## 2025-08-13 DIAGNOSIS — F17.211 CIGARETTE NICOTINE DEPENDENCE IN REMISSION: ICD-10-CM

## 2025-08-13 PROCEDURE — 71271 CT THORAX LUNG CANCER SCR C-: CPT

## 2025-08-18 DIAGNOSIS — R91.1 SOLITARY PULMONARY NODULE ON LUNG CT: Primary | ICD-10-CM

## 2025-08-30 LAB
HCT VFR BLD AUTO: 50.2 % (ref 38.5–50)
PSA SERPL-MCNC: 3.14 NG/ML
TESTOST SERPL-MCNC: 634 NG/DL (ref 250–827)

## 2025-09-03 ENCOUNTER — OFFICE VISIT (OUTPATIENT)
Dept: UROLOGY | Facility: HOSPITAL | Age: 70
End: 2025-09-03
Payer: MEDICARE

## 2025-09-03 DIAGNOSIS — R31.0 GROSS HEMATURIA: ICD-10-CM

## 2025-09-03 DIAGNOSIS — R97.20 ELEVATED PROSTATE SPECIFIC ANTIGEN (PSA): Primary | ICD-10-CM

## 2025-09-03 DIAGNOSIS — Z12.5 PROSTATE CANCER SCREENING: ICD-10-CM

## 2025-09-03 DIAGNOSIS — E29.1 HYPOGONADISM MALE: ICD-10-CM

## 2025-09-03 PROCEDURE — 99214 OFFICE O/P EST MOD 30 MIN: CPT | Performed by: UROLOGY

## 2025-09-03 PROCEDURE — 1159F MED LIST DOCD IN RCRD: CPT | Performed by: UROLOGY

## 2025-09-03 PROCEDURE — 3052F HG A1C>EQUAL 8.0%<EQUAL 9.0%: CPT | Performed by: UROLOGY

## 2025-09-03 PROCEDURE — 99212 OFFICE O/P EST SF 10 MIN: CPT

## 2025-09-03 PROCEDURE — G2211 COMPLEX E/M VISIT ADD ON: HCPCS | Performed by: UROLOGY

## 2025-09-10 ENCOUNTER — APPOINTMENT (OUTPATIENT)
Dept: DERMATOLOGY | Facility: CLINIC | Age: 70
End: 2025-09-10
Payer: MEDICARE

## 2025-10-23 ENCOUNTER — APPOINTMENT (OUTPATIENT)
Dept: OPHTHALMOLOGY | Facility: CLINIC | Age: 70
End: 2025-10-23
Payer: MEDICARE

## 2025-10-28 ENCOUNTER — APPOINTMENT (OUTPATIENT)
Dept: UROLOGY | Facility: HOSPITAL | Age: 70
End: 2025-10-28
Payer: MEDICARE

## 2025-12-05 ENCOUNTER — APPOINTMENT (OUTPATIENT)
Dept: INFECTIOUS DISEASES | Facility: CLINIC | Age: 70
End: 2025-12-05
Payer: MEDICARE

## 2025-12-10 ENCOUNTER — APPOINTMENT (OUTPATIENT)
Dept: DERMATOLOGY | Facility: CLINIC | Age: 70
End: 2025-12-10
Payer: MEDICARE